# Patient Record
Sex: FEMALE | Race: BLACK OR AFRICAN AMERICAN | NOT HISPANIC OR LATINO | URBAN - METROPOLITAN AREA
[De-identification: names, ages, dates, MRNs, and addresses within clinical notes are randomized per-mention and may not be internally consistent; named-entity substitution may affect disease eponyms.]

---

## 2018-06-02 VITALS
HEART RATE: 76 BPM | WEIGHT: 138.01 LBS | TEMPERATURE: 98 F | SYSTOLIC BLOOD PRESSURE: 160 MMHG | RESPIRATION RATE: 16 BRPM | OXYGEN SATURATION: 98 % | DIASTOLIC BLOOD PRESSURE: 96 MMHG

## 2018-06-02 LAB
ALBUMIN SERPL ELPH-MCNC: 3.4 G/DL — SIGNIFICANT CHANGE UP (ref 3.3–5)
ALP SERPL-CCNC: 48 U/L — SIGNIFICANT CHANGE UP (ref 40–120)
ALT FLD-CCNC: 22 U/L — SIGNIFICANT CHANGE UP (ref 10–45)
ANION GAP SERPL CALC-SCNC: 13 MMOL/L — SIGNIFICANT CHANGE UP (ref 5–17)
APPEARANCE UR: CLEAR — SIGNIFICANT CHANGE UP
APTT BLD: 25.3 SEC — LOW (ref 27.5–37.4)
AST SERPL-CCNC: 75 U/L — HIGH (ref 10–40)
BASOPHILS NFR BLD AUTO: 0.1 % — SIGNIFICANT CHANGE UP (ref 0–2)
BILIRUB SERPL-MCNC: 0.6 MG/DL — SIGNIFICANT CHANGE UP (ref 0.2–1.2)
BILIRUB UR-MCNC: ABNORMAL
BUN SERPL-MCNC: 18 MG/DL — SIGNIFICANT CHANGE UP (ref 7–23)
CALCIUM SERPL-MCNC: 8.9 MG/DL — SIGNIFICANT CHANGE UP (ref 8.4–10.5)
CHLORIDE SERPL-SCNC: 90 MMOL/L — LOW (ref 96–108)
CK MB CFR SERPL CALC: 22 NG/ML — HIGH (ref 0–6.7)
CK SERPL-CCNC: 1837 U/L — HIGH (ref 25–170)
CO2 SERPL-SCNC: 24 MMOL/L — SIGNIFICANT CHANGE UP (ref 22–31)
COLOR SPEC: YELLOW — SIGNIFICANT CHANGE UP
CREAT SERPL-MCNC: 1.01 MG/DL — SIGNIFICANT CHANGE UP (ref 0.5–1.3)
DIFF PNL FLD: ABNORMAL
EOSINOPHIL NFR BLD AUTO: 0.1 % — SIGNIFICANT CHANGE UP (ref 0–6)
GLUCOSE SERPL-MCNC: 100 MG/DL — HIGH (ref 70–99)
GLUCOSE UR QL: NEGATIVE — SIGNIFICANT CHANGE UP
HCT VFR BLD CALC: 36.5 % — SIGNIFICANT CHANGE UP (ref 34.5–45)
HGB BLD-MCNC: 12.7 G/DL — SIGNIFICANT CHANGE UP (ref 11.5–15.5)
INR BLD: 0.92 — SIGNIFICANT CHANGE UP (ref 0.88–1.16)
KETONES UR-MCNC: 15 MG/DL
LEUKOCYTE ESTERASE UR-ACNC: NEGATIVE — SIGNIFICANT CHANGE UP
LYMPHOCYTES # BLD AUTO: 16.3 % — SIGNIFICANT CHANGE UP (ref 13–44)
MCHC RBC-ENTMCNC: 28 PG — SIGNIFICANT CHANGE UP (ref 27–34)
MCHC RBC-ENTMCNC: 34.8 G/DL — SIGNIFICANT CHANGE UP (ref 32–36)
MCV RBC AUTO: 80.4 FL — SIGNIFICANT CHANGE UP (ref 80–100)
MONOCYTES NFR BLD AUTO: 7.6 % — SIGNIFICANT CHANGE UP (ref 2–14)
NEUTROPHILS NFR BLD AUTO: 75.9 % — SIGNIFICANT CHANGE UP (ref 43–77)
NITRITE UR-MCNC: NEGATIVE — SIGNIFICANT CHANGE UP
PH UR: 6 — SIGNIFICANT CHANGE UP (ref 5–8)
PLATELET # BLD AUTO: 268 K/UL — SIGNIFICANT CHANGE UP (ref 150–400)
POTASSIUM SERPL-MCNC: SIGNIFICANT CHANGE UP MMOL/L (ref 3.5–5.3)
POTASSIUM SERPL-SCNC: SIGNIFICANT CHANGE UP MMOL/L (ref 3.5–5.3)
PROT SERPL-MCNC: 8.1 G/DL — SIGNIFICANT CHANGE UP (ref 6–8.3)
PROT UR-MCNC: 100 MG/DL
PROTHROM AB SERPL-ACNC: 10.2 SEC — SIGNIFICANT CHANGE UP (ref 9.8–12.7)
RBC # BLD: 4.54 M/UL — SIGNIFICANT CHANGE UP (ref 3.8–5.2)
RBC # FLD: 13.1 % — SIGNIFICANT CHANGE UP (ref 10.3–16.9)
SODIUM SERPL-SCNC: 127 MMOL/L — LOW (ref 135–145)
SP GR SPEC: 1.02 — SIGNIFICANT CHANGE UP (ref 1–1.03)
TROPONIN T SERPL-MCNC: 0.14 NG/ML — CRITICAL HIGH (ref 0–0.01)
UROBILINOGEN FLD QL: 0.2 E.U./DL — SIGNIFICANT CHANGE UP
WBC # BLD: 8.5 K/UL — SIGNIFICANT CHANGE UP (ref 3.8–10.5)
WBC # FLD AUTO: 8.5 K/UL — SIGNIFICANT CHANGE UP (ref 3.8–10.5)

## 2018-06-02 PROCEDURE — 99285 EMERGENCY DEPT VISIT HI MDM: CPT | Mod: 25

## 2018-06-02 PROCEDURE — 71045 X-RAY EXAM CHEST 1 VIEW: CPT | Mod: 26

## 2018-06-02 PROCEDURE — 70450 CT HEAD/BRAIN W/O DYE: CPT | Mod: 26

## 2018-06-02 PROCEDURE — 93010 ELECTROCARDIOGRAM REPORT: CPT

## 2018-06-02 PROCEDURE — 72170 X-RAY EXAM OF PELVIS: CPT | Mod: 26

## 2018-06-02 RX ORDER — ASPIRIN/CALCIUM CARB/MAGNESIUM 324 MG
325 TABLET ORAL ONCE
Qty: 0 | Refills: 0 | Status: COMPLETED | OUTPATIENT
Start: 2018-06-02 | End: 2018-06-02

## 2018-06-02 RX ORDER — SODIUM CHLORIDE 9 MG/ML
1000 INJECTION INTRAMUSCULAR; INTRAVENOUS; SUBCUTANEOUS
Qty: 0 | Refills: 0 | Status: DISCONTINUED | OUTPATIENT
Start: 2018-06-02 | End: 2018-06-03

## 2018-06-02 RX ADMIN — SODIUM CHLORIDE 200 MILLILITER(S): 9 INJECTION INTRAMUSCULAR; INTRAVENOUS; SUBCUTANEOUS at 21:49

## 2018-06-02 RX ADMIN — Medication 325 MILLIGRAM(S): at 21:49

## 2018-06-02 NOTE — ED ADULT TRIAGE NOTE - CHIEF COMPLAINT QUOTE
I have chest pain ,been going on since this morning;     + fall last night (frequent falls this past  few days)  , with pain /bruising to arms, back and legs; feeling weak and tired

## 2018-06-02 NOTE — ED ADULT NURSE NOTE - OBJECTIVE STATEMENT
pt received into [facility] from home with two family members at bedside s/p mechanical slip and fall at home last night around 2-3 AM. Pt with multiple recent falls per family trying to walk around without cane at home when she needs it. Pt also complaining of chest pain since this morning shortly after the fall, reports it resolved after being given 162 Aspirin by EMS on the way to ED. Pt A&Ox2 to person and place not exact date. Pt moves all extremities without difficulty no obvious deformities noted r/t recent falls. 12 lead EKG done NSR noted to continuous cardiac monitor respirations appear even and unlabored sating at 98% on room air. 20G placed to LAC labs drawn and sent awaiting Md dobbins will monitor and reassess pt in NAD informed and agreeable to plan

## 2018-06-02 NOTE — ED ADULT NURSE REASSESSMENT NOTE - NS ED NURSE REASSESS COMMENT FT1
per family who just arrived, pt had been complaining of chest pain x2 days. Report when they arrived today to find patient on the floor in apartment was laying no her back with the back of her head resting on the floor. Unable to state if she hit her head or lost consciousness but to her best knowledge she did not. Poor historian.

## 2018-06-03 ENCOUNTER — INPATIENT (INPATIENT)
Facility: HOSPITAL | Age: 83
LOS: 2 days | Discharge: NURSING, REHAB OR LONG-TERM CARE | DRG: 565 | End: 2018-06-06
Attending: INTERNAL MEDICINE | Admitting: INTERNAL MEDICINE
Payer: MEDICARE

## 2018-06-03 DIAGNOSIS — J45.909 UNSPECIFIED ASTHMA, UNCOMPLICATED: ICD-10-CM

## 2018-06-03 DIAGNOSIS — E87.8 OTHER DISORDERS OF ELECTROLYTE AND FLUID BALANCE, NOT ELSEWHERE CLASSIFIED: ICD-10-CM

## 2018-06-03 DIAGNOSIS — Z29.9 ENCOUNTER FOR PROPHYLACTIC MEASURES, UNSPECIFIED: ICD-10-CM

## 2018-06-03 DIAGNOSIS — H26.9 UNSPECIFIED CATARACT: Chronic | ICD-10-CM

## 2018-06-03 DIAGNOSIS — R55 SYNCOPE AND COLLAPSE: ICD-10-CM

## 2018-06-03 DIAGNOSIS — R63.8 OTHER SYMPTOMS AND SIGNS CONCERNING FOOD AND FLUID INTAKE: ICD-10-CM

## 2018-06-03 DIAGNOSIS — I10 ESSENTIAL (PRIMARY) HYPERTENSION: ICD-10-CM

## 2018-06-03 DIAGNOSIS — R74.8 ABNORMAL LEVELS OF OTHER SERUM ENZYMES: ICD-10-CM

## 2018-06-03 DIAGNOSIS — M62.82 RHABDOMYOLYSIS: ICD-10-CM

## 2018-06-03 LAB
ANION GAP SERPL CALC-SCNC: 15 MMOL/L — SIGNIFICANT CHANGE UP (ref 5–17)
BUN SERPL-MCNC: 17 MG/DL — SIGNIFICANT CHANGE UP (ref 7–23)
CALCIUM SERPL-MCNC: 8.4 MG/DL — SIGNIFICANT CHANGE UP (ref 8.4–10.5)
CHLORIDE SERPL-SCNC: 93 MMOL/L — LOW (ref 96–108)
CK SERPL-CCNC: 1850 U/L — HIGH (ref 25–170)
CO2 SERPL-SCNC: 23 MMOL/L — SIGNIFICANT CHANGE UP (ref 22–31)
CREAT SERPL-MCNC: 0.99 MG/DL — SIGNIFICANT CHANGE UP (ref 0.5–1.3)
GLUCOSE SERPL-MCNC: 74 MG/DL — SIGNIFICANT CHANGE UP (ref 70–99)
POTASSIUM SERPL-MCNC: 3.6 MMOL/L — SIGNIFICANT CHANGE UP (ref 3.5–5.3)
POTASSIUM SERPL-MCNC: 4.8 MMOL/L — SIGNIFICANT CHANGE UP (ref 3.5–5.3)
POTASSIUM SERPL-SCNC: 3.6 MMOL/L — SIGNIFICANT CHANGE UP (ref 3.5–5.3)
POTASSIUM SERPL-SCNC: 4.8 MMOL/L — SIGNIFICANT CHANGE UP (ref 3.5–5.3)
SODIUM SERPL-SCNC: 131 MMOL/L — LOW (ref 135–145)
TROPONIN T SERPL-MCNC: 0.13 NG/ML — CRITICAL HIGH (ref 0–0.01)

## 2018-06-03 PROCEDURE — 99220: CPT

## 2018-06-03 RX ORDER — NIFEDIPINE 30 MG
30 TABLET, EXTENDED RELEASE 24 HR ORAL DAILY
Qty: 0 | Refills: 0 | Status: DISCONTINUED | OUTPATIENT
Start: 2018-06-03 | End: 2018-06-06

## 2018-06-03 RX ORDER — POTASSIUM CHLORIDE 20 MEQ
40 PACKET (EA) ORAL ONCE
Qty: 0 | Refills: 0 | Status: COMPLETED | OUTPATIENT
Start: 2018-06-03 | End: 2018-06-03

## 2018-06-03 RX ORDER — HEPARIN SODIUM 5000 [USP'U]/ML
5000 INJECTION INTRAVENOUS; SUBCUTANEOUS EVERY 8 HOURS
Qty: 0 | Refills: 0 | Status: DISCONTINUED | OUTPATIENT
Start: 2018-06-03 | End: 2018-06-06

## 2018-06-03 RX ORDER — MONTELUKAST 4 MG/1
0 TABLET, CHEWABLE ORAL
Qty: 0 | Refills: 0 | COMMUNITY

## 2018-06-03 RX ORDER — SODIUM CHLORIDE 9 MG/ML
1000 INJECTION INTRAMUSCULAR; INTRAVENOUS; SUBCUTANEOUS ONCE
Qty: 0 | Refills: 0 | Status: COMPLETED | OUTPATIENT
Start: 2018-06-03 | End: 2018-06-03

## 2018-06-03 RX ORDER — SODIUM CHLORIDE 9 MG/ML
1000 INJECTION INTRAMUSCULAR; INTRAVENOUS; SUBCUTANEOUS
Qty: 0 | Refills: 0 | Status: DISCONTINUED | OUTPATIENT
Start: 2018-06-03 | End: 2018-06-04

## 2018-06-03 RX ORDER — LOSARTAN POTASSIUM 100 MG/1
50 TABLET, FILM COATED ORAL DAILY
Qty: 0 | Refills: 0 | Status: DISCONTINUED | OUTPATIENT
Start: 2018-06-03 | End: 2018-06-06

## 2018-06-03 RX ORDER — NIFEDIPINE 30 MG
0 TABLET, EXTENDED RELEASE 24 HR ORAL
Qty: 0 | Refills: 0 | COMMUNITY

## 2018-06-03 RX ORDER — NIFEDIPINE 30 MG
30 TABLET, EXTENDED RELEASE 24 HR ORAL ONCE
Qty: 0 | Refills: 0 | Status: COMPLETED | OUTPATIENT
Start: 2018-06-03 | End: 2018-06-03

## 2018-06-03 RX ORDER — ALBUTEROL 90 UG/1
2 AEROSOL, METERED ORAL
Qty: 0 | Refills: 0 | Status: DISCONTINUED | OUTPATIENT
Start: 2018-06-03 | End: 2018-06-06

## 2018-06-03 RX ORDER — ASPIRIN/CALCIUM CARB/MAGNESIUM 324 MG
81 TABLET ORAL DAILY
Qty: 0 | Refills: 0 | Status: DISCONTINUED | OUTPATIENT
Start: 2018-06-03 | End: 2018-06-06

## 2018-06-03 RX ORDER — MONTELUKAST 4 MG/1
10 TABLET, CHEWABLE ORAL DAILY
Qty: 0 | Refills: 0 | Status: DISCONTINUED | OUTPATIENT
Start: 2018-06-03 | End: 2018-06-06

## 2018-06-03 RX ADMIN — HEPARIN SODIUM 5000 UNIT(S): 5000 INJECTION INTRAVENOUS; SUBCUTANEOUS at 13:44

## 2018-06-03 RX ADMIN — MONTELUKAST 10 MILLIGRAM(S): 4 TABLET, CHEWABLE ORAL at 11:51

## 2018-06-03 RX ADMIN — Medication 81 MILLIGRAM(S): at 11:51

## 2018-06-03 RX ADMIN — Medication 30 MILLIGRAM(S): at 11:51

## 2018-06-03 RX ADMIN — SODIUM CHLORIDE 1000 MILLILITER(S): 9 INJECTION INTRAMUSCULAR; INTRAVENOUS; SUBCUTANEOUS at 06:43

## 2018-06-03 RX ADMIN — HEPARIN SODIUM 5000 UNIT(S): 5000 INJECTION INTRAVENOUS; SUBCUTANEOUS at 06:46

## 2018-06-03 RX ADMIN — HEPARIN SODIUM 5000 UNIT(S): 5000 INJECTION INTRAVENOUS; SUBCUTANEOUS at 21:09

## 2018-06-03 RX ADMIN — Medication 40 MILLIEQUIVALENT(S): at 06:46

## 2018-06-03 RX ADMIN — Medication 30 MILLIGRAM(S): at 01:22

## 2018-06-03 RX ADMIN — LOSARTAN POTASSIUM 50 MILLIGRAM(S): 100 TABLET, FILM COATED ORAL at 06:46

## 2018-06-03 RX ADMIN — SODIUM CHLORIDE 50 MILLILITER(S): 9 INJECTION INTRAMUSCULAR; INTRAVENOUS; SUBCUTANEOUS at 13:44

## 2018-06-03 NOTE — ED PROVIDER NOTE - ATTENDING CONTRIBUTION TO CARE
I visualized pt, was present during key decision making and reviewed EKG as well as pertinent lab findings. 89 yo female with h/o HTN and CVA  presents to ED with 3 days of chest pain as well as a fall at home. Found on the floor by HHA. Pt lives by herself at home, grandsons come to visit often and found her yesterday on the floor in the bathroom lying on back and head. Pt states mild pain in back of head and has had 3 days of intermittent chest pain with no radiation, and 2 episodes of vomiting yesterday, NBNB. Pt currently complains of leg weakness. Denies SOB, LORA, heart palpitations, fever, chills, numbness/tingling. Pt family member states she took ASA several years ago. I visualized pt, was present during key decision making and reviewed EKG as well as pertinent lab findings. 89 yo female with h/o HTN and CVA  presents to ED with 3 days of chest pain as well as a fall at home. Found on the floor by grandson. EKG non-ischemic however in mild rhabdo with elevated troponin. Pt currently chest pain free, nl VS, no signs of trauma, CT head unremarkable. Will admit to cardiac telemetry for trending of troponins.

## 2018-06-03 NOTE — H&P ADULT - PROBLEM SELECTOR PLAN 4
On Losartan-HCTZ 25-12.5 and Nifedipine XL 30mg at home. Will continue as no renal dysfunction as result of rhabdo, and BP was elevated in ED.   Will watch to ensure that patient does not become hypotensive, or orthostatic. On Losartan-HCTZ 25-12.5 and Nifedipine XL 30mg at home. Will continue as no renal dysfunction as result of rhabdo, and BP was elevated in ED.   Will watch to ensure that patient does not become hypotensive, or orthostatic.  Holding HCTZ in setting of dehydration

## 2018-06-03 NOTE — H&P ADULT - PROBLEM SELECTOR PLAN 1
Cardiogenic vs. orthostatic Multiple falls over the past 3 days in setting of vomiting, decreased PO intake and HCTZ use. Patient reports feeling dizzy prior to falling and falls all occurred soon after standing up. + Orthostatics on admission. Falls likely due to orthostatic hypotension. Patient denies cp or palpitation prior to fall making cardiogenic etiology less likely. Denies LOC.   -C/w hydration, NS @ 200cc/hr  -Repeat orthostatics in AM  -Hold hydrochlorothiazide  -Clarify med list with family Multiple falls over the past 3 days in setting of vomiting, decreased PO intake and HCTZ use. Patient reports feeling dizzy prior to falling and falls all occurred soon after standing up. + Orthostatics on admission. Falls likely due to orthostatic hypotension. Patient denies cp or palpitation prior to fall making cardiogenic etiology less likely. Denies LOC.   -Bolus 1L NS cx2 then c/w NS @ 200cc/hr  -Repeat orthostatics in AM  -Hold hydrochlorothiazide  -Clarify med list with family

## 2018-06-03 NOTE — H&P ADULT - ASSESSMENT
89 yo F with PMH HTN, asthma, stroke (>10 years ago, no residual deficits) presenting with multiple falls in the past day, likely due to orthostatic hypotension. Patient also reporting chest pain of unclear chronicity and with positive troponins, however no ischemic changes on ECG.

## 2018-06-03 NOTE — H&P ADULT - NSHPREVIEWOFSYSTEMS_GEN_ALL_CORE
Patient denies any recent URI symptoms, f/c, diarrhea, dysuria.    Reports chronic headaches and blurry vision.

## 2018-06-03 NOTE — H&P ADULT - PROBLEM SELECTOR PLAN 2
Troponin elevated to 0.14 on admission, ECG without acute ischemic changes. Patient reports chest pain with exertion relieved with rest typical of angina, but denies any recent changes in her chronic chest pain. However the family reports she has been complaining of CP over the past 2 days.   -S/p ASA 300mg in ED  -Troponin peaked at 0.14  -F/u AM ECG Troponin elevated to 0.14 on admission, ECG without acute ischemic changes. Patient reports chest pain with exertion relieved with rest typical of angina, but denies any recent changes in her chronic chest pain. However the family reports she has been complaining of CP over the past 2 days. CP free on admission.   -S/p ASA 300mg in ED  -Troponin peaked at 0.14  -F/u AM ECG Troponin elevated to 0.14 on admission, ECG without acute ischemic changes. Patient reports chest pain with exertion relieved with rest typical of angina, but denies any recent changes in her chronic chest pain. However the family reports she has been complaining of CP over the past 2 days. CP free on admission. Elevation may be false positive in setting of mild rhabdo, less likely NSTEMI given patient describing chronic, unchanged chest pain.   -S/p ASA 300mg in ED  -Troponin peaked at 0.14  -F/u AM ECG Troponin elevated to 0.14 on admission, ECG without acute ischemic changes. Patient reports chest pain with exertion relieved with rest typical of angina, but denies any recent changes in her chronic chest pain. However the family reports she has been complaining of CP over the past 2 days. CP free on admission. Elevation may be false positive in setting of mild rhabdo, less likely NSTEMI given patient describing chronic, unchanged chest pain.   -S/p ASA 300mg in ED  -ASA 81mg daily  -Troponin peaked at 0.14  -F/u AM ECG

## 2018-06-03 NOTE — H&P ADULT - NSHPPHYSICALEXAM_GEN_ALL_CORE
.  VITAL SIGNS:  T(C): 36.2 (06-03-18 @ 00:23), Max: 36.7 (06-02-18 @ 20:14)  T(F): 97.2 (06-03-18 @ 00:23), Max: 98.1 (06-02-18 @ 20:14)  HR: 70 (06-03-18 @ 00:23) (70 - 76)  BP: 187/70 (06-03-18 @ 00:23) (160/96 - 187/70)  BP(mean): --  RR: 16 (06-03-18 @ 00:23) (16 - 16)  SpO2: 100% (06-03-18 @ 00:23) (98% - 100%)  Wt(kg): --    PHYSICAL EXAM:    Constitutional: WDWN resting comfortably in bed; NAD  Head: NC/AT  Eyes: PERRL, EOMI, clear conjunctiva  ENT: no nasal discharge; uvula midline, no oropharyngeal erythema or exudates; MMM  Neck: supple; no JVD or thyromegaly  Respiratory: CTA B/L; no W/R/R, no retractions  Cardiac: +S1/S2; RRR; no M/R/G; PMI non-displaced  Gastrointestinal: soft, NT/ND; no rebound or guarding; +BSx4  Genitourinary: normal external genitalia  Back: spine midline, no bony tenderness or step-offs; no CVAT B/L  Extremities: WWP, no clubbing or cyanosis; no peripheral edema  Musculoskeletal: NROM x4; no joint swelling, tenderness or erythema  Vascular: 2+ radial, femoral, DP/PT pulses B/L  Dermatologic: skin warm, dry and intact; no rashes, wounds, or scars  Lymphatic: no submandibular or cervical LAD  Neurologic: AAOx3; CNII-XII grossly intact; no focal deficits  Psychiatric: affect and characteristics of appearance, verbalizations, behaviors are appropriate VITAL SIGNS:  T(C): 36.2 (06-03-18 @ 00:23), Max: 36.7 (06-02-18 @ 20:14)  T(F): 97.2 (06-03-18 @ 00:23), Max: 98.1 (06-02-18 @ 20:14)  HR: 70 (06-03-18 @ 00:23) (70 - 76)  BP: 187/70 (06-03-18 @ 00:23) (160/96 - 187/70)  BP(mean): --  RR: 16 (06-03-18 @ 00:23) (16 - 16)  SpO2: 100% (06-03-18 @ 00:23) (98% - 100%)  Wt(kg): --    PHYSICAL EXAM:    Constitutional: Elderly female resting comfortably in bed; NAD  Head: NC/AT  Eyes: PERRL, EOMI, clear conjunctiva  ENT: no nasal discharge; uvula midline, no oropharyngeal erythema or exudates; MM dry  Neck: supple; no JVD  Respiratory: CTA B/L; no W/R/R, decreased breath sounds at bases.   Cardiac: +S1/S2; RRR; no M/R/G  Gastrointestinal: soft, NT/ND; no rebound or guarding; +BS  Genitourinary: + Montoya in place  Extremities: WWP, no clubbing or cyanosis; no peripheral edema  Vascular: 2+ radial pulses B/L  Dermatologic: skin warm, dry and intact; no rashes, wounds, or scars  Neurologic: AAOx2; oriented to person and place but not year.

## 2018-06-03 NOTE — H&P ADULT - HISTORY OF PRESENT ILLNESS
89 yo F with PMH HTN, asthma, stroke (>10 years ago, no residual deficits) presenting with 3 falls in the past day. The falls occurred soon after getting up from sitting/ laying down. She reports feeling dizzy for the past few days, but denies any chest pain or palpitations prior to the fall. She reports hitting her head but denies LOC. She was unable to get up after the fall, her son found her down. It is unclear how long she was down after each fall. She reports episodes of vomiting over the past 2 days (unclear frequency) and decreased PO intake. She reports that she has not taken her medications for the past 2 days. Prior to that she reports taking her medications regularly but is unable to remember the names of any of her medications. Per her pharmacy she was prescribed losartan-HCTZ 1 month ago, however according to med list provided by her son she is only taking Nifedipine 30mg. It is unclear if she is taking her medications as prescribed. Patient lives alone but family helps with shopping/ cooking and report a decline in her functioning and increasing weakness over the past year.     In the ED, patient was afebrile Tm 89 yo F with PMH HTN, asthma, stroke (>10 years ago, no residual deficits) presenting with 3 falls in the past day. The falls occurred soon after getting up from sitting/ laying down. She reports feeling dizzy for the past few days, but denies any chest pain or palpitations prior to the fall.  She reports hitting her head but denies LOC. She was unable to get up after the fall, her son found her down. It is unclear how long she was down after each fall. However she does report intermittent chest pain described as "tightness" that is provoked by activity and relieved with rest, reports this has been going on for a "long time" however the family reports she has been c/o CP more over the past 2 days. She also reports intermittent SOB, unclear if it is associated with chest pain or asthma. She reports episodes of vomiting over the past 2 days (unclear frequency) and decreased PO intake. She reports that she has not taken her medications for the past 2 days. Prior to that she reports taking her medications regularly but is unable to remember the names of any of her medications. Per her pharmacy she was prescribed losartan-HCTZ 1 month ago, however according to med list provided by her son her only cardiac med is Nifedipine 30mg. It is unclear if she is taking her medications as prescribed. Patient lives alone but family helps with shopping/ cooking and report a decline in her functioning and increasing weakness over the past year.     In the ED, patient was afebrile Tm 98.1, HR 60-75, //70, RR 16 O2 sat %. Labs significant for trop 0.14, CK 1800, and hyponatremia to 127. ECG NSR with bifascicular block, no acute ischemic changes. CT head negative for ICH or fracture.

## 2018-06-03 NOTE — ED ADULT NURSE REASSESSMENT NOTE - NS ED NURSE REASSESS COMMENT FT1
pt BP elevated as noted, reports did not take her normal BP med today Nifedipine ER 30mg Dima Cannon made aware will medicate per orders monitor and reassess. Pt in NAD informed and agreeable to plan

## 2018-06-03 NOTE — H&P ADULT - PROBLEM SELECTOR PLAN 3
Patient found down by son multiple times in past day. CK elevated to 1800s on admission and UA + for blood but no RBCs.   -C/w hydration as above. Patient found down by son multiple times in past day. CK elevated to 1800s on admission and UA + for blood but no RBCs.   -C/w hydration as above.  -Trend CK

## 2018-06-03 NOTE — H&P ADULT - PROBLEM SELECTOR PLAN 8
F: NS @ 200cc/hr  E: Replete PRN  N:     FULL CODE F: NS @ 200cc/hr  E: Replete PRN  N: DASH diet    FULL CODE

## 2018-06-03 NOTE — H&P ADULT - PROBLEM SELECTOR PLAN 5
HypoNa, HypoCl is most likely due to effect of HCTZ in addition to hypovolemia and poor PO intake, as shown by + ketones in UA.   Potassium hemolyzed on admission, will repeat labs in AM. Will also obtain urine lytes at that time as well.   Continue on NS 200cc/hour at this time for rhabdo, this will likely correct the Na as well.   Will hold off on HCTZ portion of combo to avoid hypovolemia.   Montoya placed for strict I&O.

## 2018-06-03 NOTE — ED PROVIDER NOTE - MEDICAL DECISION MAKING DETAILS
PT HAD CHEST PAIN YESTERDAY AND FALLS NOW ELEVATED TROP DW DR BUCK WILL ADMIT FOR FLUIDS FOR ELEVATED CPK AND LOW SODIUM AND TREND TROPS

## 2018-06-03 NOTE — ED PROVIDER NOTE - OBJECTIVE STATEMENT
89 yo female with a pmhx of HTN and stroke several years ago, presents to ED with 3 days of chest pain and multiple falls yesterday and today. Pt lives by herself at home, grandsons come to visit often and found her yesterday on the floor in the bathroom lying on back and head. Pt states mild pain in back of head and has had 3 days of intermittent chest pain with no radiation, and 2 episodes of vomiting yesterday, NBNB. Pt currently complains of leg weakness. Denies SOB, LORA, heart palpitations, fever, chills, numbness/tingling. Pt family member states she took ASA several years ago.

## 2018-06-03 NOTE — H&P ADULT - NSHPLABSRESULTS_GEN_ALL_CORE
LABS:                         12.7   8.5   )-----------( 268      ( 2018 20:31 )             36.5     06-03    x   |  x   |  x   ----------------------------<  x   4.8   |  x   |  x     Ca    8.9      2018 20:31    TPro  8.1  /  Alb  3.4  /  TBili  0.6  /  DBili  x   /  AST  75<H>  /  ALT  22  /  AlkPhos  48  06-02    PT/INR - ( 2018 20:31 )   PT: 10.2 sec;   INR: 0.92          PTT - ( 2018 20:31 )  PTT:25.3 sec  Urinalysis Basic - ( 2018 21:23 )    Color: Yellow / Appearance: Clear / S.020 / pH: x  Gluc: x / Ketone: 15 mg/dL  / Bili: Small / Urobili: 0.2 E.U./dL   Blood: x / Protein: 100 mg/dL / Nitrite: NEGATIVE   Leuk Esterase: NEGATIVE / RBC: < 5 /HPF / WBC < 5 /HPF   Sq Epi: x / Non Sq Epi: 5-10 /HPF / Bacteria: Present /HPF      CARDIAC MARKERS ( 2018 00:33 )  x     / 0.13 ng/mL / x     / x     / x      CARDIAC MARKERS ( 2018 20:31 )  x     / 0.14 ng/mL / 1837 U/L / x     / 22.0 ng/mL            RADIOLOGY, EKG & ADDITIONAL TESTS: Reviewed. LABS:                         12.7   8.5   )-----------( 268      ( 2018 20:31 )             36.5     06-03    x   |  x   |  x   ----------------------------<  x   4.8   |  x   |  x     Ca    8.9      2018 20:31    TPro  8.1  /  Alb  3.4  /  TBili  0.6  /  DBili  x   /  AST  75<H>  /  ALT  22  /  AlkPhos  48  06-02    PT/INR - ( 2018 20:31 )   PT: 10.2 sec;   INR: 0.92          PTT - ( 2018 20:31 )  PTT:25.3 sec  Urinalysis Basic - ( 2018 21:23 )    Color: Yellow / Appearance: Clear / S.020 / pH: x  Gluc: x / Ketone: 15 mg/dL  / Bili: Small / Urobili: 0.2 E.U./dL   Blood: x / Protein: 100 mg/dL / Nitrite: NEGATIVE   Leuk Esterase: NEGATIVE / RBC: < 5 /HPF / WBC < 5 /HPF   Sq Epi: x / Non Sq Epi: 5-10 /HPF / Bacteria: Present /HPF      CARDIAC MARKERS ( 2018 00:33 )  x     / 0.13 ng/mL / x     / x     / x      CARDIAC MARKERS ( 2018 20:31 )  x     / 0.14 ng/mL / 1837 U/L / x     / 22.0 ng/mL        RADIOLOGY, EKG & ADDITIONAL TESTS:     < from: CT Head No Cont (18 @ 22:20) >    FINDINGS:     The size and configuration of the ventricles and sulci are appropriate   for patient's age. Focal wedge-shaped area of encephalomalacia/gliosis   involving the left occipitotemporal lobe, consistent with remote   infarction. Ex vacuo dilatation of the adjacent posterior left lateral   ventricle. No acute intracranial hemorrhage or transcortical infarction.   No extra-axial fluid collection, herniation, or midline shift. Confluent   periventricular lucency is noted, suggestive of microangiopathic ischemic   disease.    The calvarium is intact. Opacified right sphenoid sinus. The remaining   visualized paranasal sinuses and mastoid air cells are clear.     IMPRESSION:     No acute intracranial hemorrhage or calvarial fracture. Chronic findings,   as above.      < end of copied text >

## 2018-06-04 ENCOUNTER — TRANSCRIPTION ENCOUNTER (OUTPATIENT)
Age: 83
End: 2018-06-04

## 2018-06-04 DIAGNOSIS — R29.6 REPEATED FALLS: ICD-10-CM

## 2018-06-04 DIAGNOSIS — E87.1 HYPO-OSMOLALITY AND HYPONATREMIA: ICD-10-CM

## 2018-06-04 DIAGNOSIS — I95.1 ORTHOSTATIC HYPOTENSION: ICD-10-CM

## 2018-06-04 LAB
ANION GAP SERPL CALC-SCNC: 11 MMOL/L — SIGNIFICANT CHANGE UP (ref 5–17)
BUN SERPL-MCNC: 11 MG/DL — SIGNIFICANT CHANGE UP (ref 7–23)
CALCIUM SERPL-MCNC: 9.2 MG/DL — SIGNIFICANT CHANGE UP (ref 8.4–10.5)
CHLORIDE SERPL-SCNC: 99 MMOL/L — SIGNIFICANT CHANGE UP (ref 96–108)
CHOLEST SERPL-MCNC: 202 MG/DL — HIGH (ref 10–199)
CO2 SERPL-SCNC: 27 MMOL/L — SIGNIFICANT CHANGE UP (ref 22–31)
CREAT SERPL-MCNC: 0.87 MG/DL — SIGNIFICANT CHANGE UP (ref 0.5–1.3)
CULTURE RESULTS: SIGNIFICANT CHANGE UP
GLUCOSE SERPL-MCNC: 83 MG/DL — SIGNIFICANT CHANGE UP (ref 70–99)
HDLC SERPL-MCNC: 91 MG/DL — SIGNIFICANT CHANGE UP (ref 40–125)
LIPID PNL WITH DIRECT LDL SERPL: 99 MG/DL — SIGNIFICANT CHANGE UP
MAGNESIUM SERPL-MCNC: 1.5 MG/DL — LOW (ref 1.6–2.6)
POTASSIUM SERPL-MCNC: 3.7 MMOL/L — SIGNIFICANT CHANGE UP (ref 3.5–5.3)
POTASSIUM SERPL-SCNC: 3.7 MMOL/L — SIGNIFICANT CHANGE UP (ref 3.5–5.3)
SODIUM SERPL-SCNC: 137 MMOL/L — SIGNIFICANT CHANGE UP (ref 135–145)
SPECIMEN SOURCE: SIGNIFICANT CHANGE UP
TOTAL CHOLESTEROL/HDL RATIO MEASUREMENT: 2.2 RATIO — LOW (ref 3.3–7.1)
TRIGL SERPL-MCNC: 60 MG/DL — SIGNIFICANT CHANGE UP (ref 10–149)
TSH SERPL-MCNC: 3.04 UIU/ML — SIGNIFICANT CHANGE UP (ref 0.35–4.94)

## 2018-06-04 PROCEDURE — 99233 SBSQ HOSP IP/OBS HIGH 50: CPT

## 2018-06-04 PROCEDURE — 99239 HOSP IP/OBS DSCHRG MGMT >30: CPT

## 2018-06-04 RX ORDER — LOSARTAN POTASSIUM 100 MG/1
1 TABLET, FILM COATED ORAL
Qty: 30 | Refills: 0 | OUTPATIENT
Start: 2018-06-04 | End: 2018-07-03

## 2018-06-04 RX ORDER — LOSARTAN/HYDROCHLOROTHIAZIDE 100MG-25MG
1 TABLET ORAL
Qty: 0 | Refills: 0 | COMMUNITY

## 2018-06-04 RX ORDER — ASPIRIN/CALCIUM CARB/MAGNESIUM 324 MG
1 TABLET ORAL
Qty: 30 | Refills: 0 | OUTPATIENT
Start: 2018-06-04 | End: 2018-07-03

## 2018-06-04 RX ORDER — POTASSIUM CHLORIDE 20 MEQ
40 PACKET (EA) ORAL ONCE
Qty: 0 | Refills: 0 | Status: COMPLETED | OUTPATIENT
Start: 2018-06-04 | End: 2018-06-04

## 2018-06-04 RX ORDER — MAGNESIUM SULFATE 500 MG/ML
3 VIAL (ML) INJECTION ONCE
Qty: 0 | Refills: 0 | Status: COMPLETED | OUTPATIENT
Start: 2018-06-04 | End: 2018-06-04

## 2018-06-04 RX ADMIN — HEPARIN SODIUM 5000 UNIT(S): 5000 INJECTION INTRAVENOUS; SUBCUTANEOUS at 15:20

## 2018-06-04 RX ADMIN — Medication 40 MILLIEQUIVALENT(S): at 07:17

## 2018-06-04 RX ADMIN — LOSARTAN POTASSIUM 50 MILLIGRAM(S): 100 TABLET, FILM COATED ORAL at 05:33

## 2018-06-04 RX ADMIN — MONTELUKAST 10 MILLIGRAM(S): 4 TABLET, CHEWABLE ORAL at 12:14

## 2018-06-04 RX ADMIN — Medication 81 MILLIGRAM(S): at 12:14

## 2018-06-04 RX ADMIN — HEPARIN SODIUM 5000 UNIT(S): 5000 INJECTION INTRAVENOUS; SUBCUTANEOUS at 21:25

## 2018-06-04 RX ADMIN — Medication 25 GRAM(S): at 07:17

## 2018-06-04 RX ADMIN — HEPARIN SODIUM 5000 UNIT(S): 5000 INJECTION INTRAVENOUS; SUBCUTANEOUS at 06:20

## 2018-06-04 RX ADMIN — Medication 30 MILLIGRAM(S): at 05:33

## 2018-06-04 NOTE — DISCHARGE NOTE ADULT - SECONDARY DIAGNOSIS.
Asthma, unspecified asthma severity, unspecified whether complicated, unspecified whether persistent Cerebrovascular accident (CVA), unspecified mechanism Essential hypertension Traumatic rhabdomyolysis, initial encounter Hyponatremia

## 2018-06-04 NOTE — DISCHARGE NOTE ADULT - PLAN OF CARE
prevent recurrence You were admitted with orthostatic hypotension, which is where your blood pressure drops when going from sitting to standing.  The cause of your orthostatic hypotension was likely dehydration.  Drink plenty of fluids and eat three good meals per day.  Stop taking hydrochlorothiazide.  Follow up with your PMD within one week Continue taking singulair and albuterol as directed. follow up with your pmd. continue taking losartan and nifedipine as directed.  STOP TAKING HYDROCHLOROTHIAZIDE You presented with hyponatremia which is a low level of sodium in your blood.  This was likely caused by dehydration and a blood pressure medication you were taking, hydrochlorothiazide (HCTZ).  STOP TAKING HYDROCHLOROTHIAZIDE.  Eat three good meals per day. Rhabdomyolysis is a breakdown of muscle.  You experienced a mild rhabdomyolysis due to your falls.  There were no significant sequelae of this.  follow up with your pmd.

## 2018-06-04 NOTE — PHYSICAL THERAPY INITIAL EVALUATION ADULT - IMPAIRMENTS FOUND, PT EVAL
muscle strength/poor safety awareness/posture/aerobic capacity/endurance/gait, locomotion, and balance/ergonomics and body mechanics

## 2018-06-04 NOTE — PHYSICAL THERAPY INITIAL EVALUATION ADULT - PLANNED THERAPY INTERVENTIONS, PT EVAL
postural re-education/strengthening/transfer training/balance training/bed mobility training/gait training/ROM

## 2018-06-04 NOTE — PHYSICAL THERAPY INITIAL EVALUATION ADULT - ADDITIONAL COMMENTS
Patient lives in home with Son with 5 steps to enter. Patient uses SC/RW at baseline depending on day and requires assist from Son. Patient with h/o multiple falls in the home.

## 2018-06-04 NOTE — PROGRESS NOTE ADULT - SUBJECTIVE AND OBJECTIVE BOX
INTERVAL/OVERNIGHT EVENTS:  Troponin trending up to 0.03.  Otherwise NITESH.      SUBJECTIVE:  Seen and examined at bedside.    ROS otherwise negative.    VITAL SIGNS:  T(F): 98 (18 @ 05:21)  HR: 84 (18 @ 06:40)  BP: 165/94 (18 @ 06:40)  RR: 22 (18 @ 06:40)  SpO2: 97% (18 @ 06:40)  Wt(kg): --    PHYSICAL EXAM:        MEDICATIONS  (STANDING):  aspirin enteric coated 81 milliGRAM(s) Oral daily  heparin  Injectable 5000 Unit(s) SubCutaneous every 8 hours  losartan 50 milliGRAM(s) Oral daily  montelukast 10 milliGRAM(s) Oral daily  NIFEdipine XL 30 milliGRAM(s) Oral daily  sodium chloride 0.9%. 1000 milliLiter(s) (50 mL/Hr) IV Continuous <Continuous>    MEDICATIONS  (PRN):  ALBUTerol    90 MICROgram(s) HFA Inhaler 2 Puff(s) Inhalation four times a day PRN Shortness of Breath and/or Wheezing      Allergies    No Known Allergies    Intolerances      ALBUTerol    90 MICROgram(s) HFA Inhaler 2 Puff(s) Inhalation four times a day PRN  aspirin enteric coated 81 milliGRAM(s) Oral daily  heparin  Injectable 5000 Unit(s) SubCutaneous every 8 hours  losartan 50 milliGRAM(s) Oral daily  montelukast 10 milliGRAM(s) Oral daily  NIFEdipine XL 30 milliGRAM(s) Oral daily  sodium chloride 0.9%. 1000 milliLiter(s) IV Continuous <Continuous>      LABS:                          12.7   8.5   )-----------( 268      ( 2018 20:31 )             36.5     06-04    137  |  99  |  11  ----------------------------<  83  3.7   |  27  |  0.87    Ca    9.2      2018 06:23  Mg     1.5     -04    TPro  8.1  /  Alb  3.4  /  TBili  0.6  /  DBili  x   /  AST  75<H>  /  ALT  22  /  AlkPhos  48  06-02    PT/INR - ( 2018 20:31 )   PT: 10.2 sec;   INR: 0.92          PTT - ( 2018 20:31 )  PTT:25.3 sec  Urinalysis Basic - ( 2018 21:23 )    Color: Yellow / Appearance: Clear / S.020 / pH: x  Gluc: x / Ketone: 15 mg/dL  / Bili: Small / Urobili: 0.2 E.U./dL   Blood: x / Protein: 100 mg/dL / Nitrite: NEGATIVE   Leuk Esterase: NEGATIVE / RBC: < 5 /HPF / WBC < 5 /HPF   Sq Epi: x / Non Sq Epi: 5-10 /HPF / Bacteria: Present /HPF        RADIOLOGY & ADDITIONAL TESTS:  All imaging reviewed. INTERVAL/OVERNIGHT EVENTS:  Troponin trending up to 0.03.  Otherwise NITESH.      SUBJECTIVE:  Seen and examined at bedside.  Reports feeling well.  Denies lightheadedness, CP, palpitations, SOB, cough, orthopnea, abd pain, N/V/D/C, F/C, leg pain or edema.    ROS otherwise negative.    VITAL SIGNS:  T(F): 98 (18 @ 05:21)  HR: 84 (18 @ 06:40)  BP: 165/94 (18 @ 06:40)  RR: 22 (18 @ 06:40)  SpO2: 97% (18 @ 06:40)  Wt(kg): --    PHYSICAL EXAM:    	Constitutional: Elderly female resting comfortably in bed; NAD, ox2 (not time)  	Head: NC/AT  	Eyes: PERRL, EOMI, clear conjunctiva  	ENT: no nasal discharge; uvula midline, no oropharyngeal erythema or exudates; MMM  	Neck: supple; no JVD  	Respiratory: CTA B/L; no W/R/R, decreased breath sounds at bases.   	Cardiac: +S1/S2; RRR; no M/R/G  	Gastrointestinal: soft, NT/ND; no rebound or guarding; +BS  	Extremities: WWP, no clubbing or cyanosis; no peripheral edema  	Vascular: 2+ radial pulses B/L  	Dermatologic: skin warm, dry and intact; no rashes, wounds, or scars  Neurologic: AAOx2; oriented to person and place but not year.    MEDICATIONS  (STANDING):  aspirin enteric coated 81 milliGRAM(s) Oral daily  heparin  Injectable 5000 Unit(s) SubCutaneous every 8 hours  losartan 50 milliGRAM(s) Oral daily  montelukast 10 milliGRAM(s) Oral daily  NIFEdipine XL 30 milliGRAM(s) Oral daily  sodium chloride 0.9%. 1000 milliLiter(s) (50 mL/Hr) IV Continuous <Continuous>    MEDICATIONS  (PRN):  ALBUTerol    90 MICROgram(s) HFA Inhaler 2 Puff(s) Inhalation four times a day PRN Shortness of Breath and/or Wheezing      Allergies    No Known Allergies    Intolerances      ALBUTerol    90 MICROgram(s) HFA Inhaler 2 Puff(s) Inhalation four times a day PRN  aspirin enteric coated 81 milliGRAM(s) Oral daily  heparin  Injectable 5000 Unit(s) SubCutaneous every 8 hours  losartan 50 milliGRAM(s) Oral daily  montelukast 10 milliGRAM(s) Oral daily  NIFEdipine XL 30 milliGRAM(s) Oral daily  sodium chloride 0.9%. 1000 milliLiter(s) IV Continuous <Continuous>      LABS:                          12.7   8.5   )-----------( 268      ( 2018 20:31 )             36.5     06-04    137  |  99  |  11  ----------------------------<  83  3.7   |  27  |  0.87    Ca    9.2      2018 06:23  Mg     1.5     06-04    TPro  8.1  /  Alb  3.4  /  TBili  0.6  /  DBili  x   /  AST  75<H>  /  ALT  22  /  AlkPhos  48  06-02    PT/INR - ( 2018 20:31 )   PT: 10.2 sec;   INR: 0.92          PTT - ( 2018 20:31 )  PTT:25.3 sec  Urinalysis Basic - ( 2018 21:23 )    Color: Yellow / Appearance: Clear / S.020 / pH: x  Gluc: x / Ketone: 15 mg/dL  / Bili: Small / Urobili: 0.2 E.U./dL   Blood: x / Protein: 100 mg/dL / Nitrite: NEGATIVE   Leuk Esterase: NEGATIVE / RBC: < 5 /HPF / WBC < 5 /HPF   Sq Epi: x / Non Sq Epi: 5-10 /HPF / Bacteria: Present /HPF        RADIOLOGY & ADDITIONAL TESTS:  All imaging reviewed.

## 2018-06-04 NOTE — DISCHARGE NOTE ADULT - MEDICATION SUMMARY - MEDICATIONS TO STOP TAKING
I will STOP taking the medications listed below when I get home from the hospital:    losartan-hydrochlorothiazide 50mg-12.5mg oral tablet  -- 1 tab(s) by mouth once a day

## 2018-06-04 NOTE — PHYSICAL THERAPY INITIAL EVALUATION ADULT - CRITERIA FOR SKILLED THERAPEUTIC INTERVENTIONS
predicted duration of therapy intervention/risk reduction/prevention/rehab potential/functional limitations in following categories/impairments found/anticipated discharge recommendation/therapy frequency/anticipated equipment needs at discharge

## 2018-06-04 NOTE — DISCHARGE NOTE ADULT - PATIENT PORTAL LINK FT
You can access the [facility] Patient Portal, offered by [facility], by registering with the following website: [email or web address]

## 2018-06-04 NOTE — DISCHARGE NOTE ADULT - MEDICATION SUMMARY - MEDICATIONS TO TAKE
I will START or STAY ON the medications listed below when I get home from the hospital:    aspirin 81 mg oral delayed release tablet  -- 1 tab(s) by mouth once a day MDD:1 tab  -- Indication: For CVA (cerebral vascular accident)    losartan 50 mg oral tablet  -- 1 tab(s) by mouth once a day MDD:1 tab daily  -- Indication: For HTN (hypertension)    ProAir HFA 90 mcg/inh inhalation aerosol  -- 2 puff(s) inhaled 4 times a day, As Needed  -- Indication: For Asthma    Procardia XL 30 mg oral tablet, extended release  -- 1 tab(s) by mouth once a day  -- Indication: For HTN (hypertension)    Singulair 10 mg oral tablet  -- 1 tab(s) by mouth once a day  -- Indication: For Asthma    magnesium oxide 400 mg (240 mg elemental magnesium) oral tablet  -- Indication: For Nutrition, metabolism, and development symptoms

## 2018-06-04 NOTE — DISCHARGE NOTE ADULT - HOSPITAL COURSE
89 yo F with PMH HTN, asthma, stroke (>10 years ago, no residual deficits) who presented with 3 falls in the day PTA.  In the ED, patient was afebrile Tm 98.1, HR 60-75, //70, RR 16 O2 sat %. Labs significant for trop 0.14, CK 1800, and hyponatremia to 127. ECG NSR with bifascicular block, no acute ischemic changes. CT head negative for ICH or fracture.  Orthostatic vital signs were positive.  She received IVF with improvement in her symptoms.  She was hydronatremic which improved with holding HCTZ.  She experienced a mild troponinemia that peaked <0.2, and was attributed to demand ischemia. Her falls led to a mild rhabdo that remained stable (CK<2k) without any renal sequelae.  For HTN, she was maintained on losartan and nifedipine XL.  Her BP tended to drop to the 110s shortly after receiving her morning medications, so despite running high in the evenings (to systolic 170s, asymptomatic) her regimen was not changed to avoid hypotension. She was evaluated by PT who reccomended [facility] 89 yo F with PMH HTN, asthma, stroke (>10 years ago, no residual deficits) who presented with 3 falls in the day PTA.  In the ED, patient was afebrile Tm 98.1, HR 60-75, //70, RR 16 O2 sat %. Labs significant for trop 0.14, CK 1800, and hyponatremia to 127. ECG NSR with bifascicular block, no acute ischemic changes. CT head negative for ICH or fracture.  Orthostatic vital signs were positive.  She received IVF with improvement in her symptoms.  She was hydronatremic which improved with holding HCTZ.  She was hypomagnesemic and repleted with IV magnesium. She experienced a mild troponinemia that peaked <0.2, and was attributed to demand ischemia. Her falls led to a mild rhabdo that remained stable (CK<2k) without any renal sequelae.  For HTN, she was maintained on losartan and nifedipine XL.  Her BP tended to drop to the 110s shortly after receiving her morning medications, so despite running high in the evenings (to systolic 170s, asymptomatic) her regimen was not changed to avoid hypotension. She was evaluated by PT who reccomended [facility]

## 2018-06-04 NOTE — DISCHARGE NOTE ADULT - ADDITIONAL INSTRUCTIONS
Stop taking hydrochlorothiazide.  Eat three good meals per day.  Drink plenty of water (approximately 64 ounces per day).  Follow up with your PMD.

## 2018-06-04 NOTE — PHYSICAL THERAPY INITIAL EVALUATION ADULT - PERTINENT HX OF CURRENT PROBLEM, REHAB EVAL
Patient is a 89 y/o F s/p mechanical fall in the home with additional c/o intermittent chest tightness.

## 2018-06-04 NOTE — DISCHARGE NOTE ADULT - CARE PLAN
Principal Discharge DX:	Orthostatic hypotension  Goal:	prevent recurrence  Assessment and plan of treatment:	You were admitted with orthostatic hypotension, which is where your blood pressure drops when going from sitting to standing.  The cause of your orthostatic hypotension was likely dehydration.  Drink plenty of fluids and eat three good meals per day.  Stop taking hydrochlorothiazide.  Follow up with your PMD within one week  Secondary Diagnosis:	Asthma, unspecified asthma severity, unspecified whether complicated, unspecified whether persistent  Assessment and plan of treatment:	Continue taking singulair and albuterol as directed.  Secondary Diagnosis:	Cerebrovascular accident (CVA), unspecified mechanism  Assessment and plan of treatment:	follow up with your pmd.  Secondary Diagnosis:	Essential hypertension  Assessment and plan of treatment:	continue taking losartan and nifedipine as directed.  STOP TAKING HYDROCHLOROTHIAZIDE  Secondary Diagnosis:	Traumatic rhabdomyolysis, initial encounter  Assessment and plan of treatment:	follow up with your pmd.  Secondary Diagnosis:	Hyponatremia  Assessment and plan of treatment:	You presented with hyponatremia which is a low level of sodium in your blood.  This was likely caused by dehydration and a blood pressure medication you were taking, hydrochlorothiazide (HCTZ).  STOP TAKING HYDROCHLOROTHIAZIDE.  Eat three good meals per day. Principal Discharge DX:	Orthostatic hypotension  Goal:	prevent recurrence  Assessment and plan of treatment:	You were admitted with orthostatic hypotension, which is where your blood pressure drops when going from sitting to standing.  The cause of your orthostatic hypotension was likely dehydration.  Drink plenty of fluids and eat three good meals per day.  Stop taking hydrochlorothiazide.  Follow up with your PMD within one week  Secondary Diagnosis:	Asthma, unspecified asthma severity, unspecified whether complicated, unspecified whether persistent  Assessment and plan of treatment:	Continue taking singulair and albuterol as directed.  Secondary Diagnosis:	Cerebrovascular accident (CVA), unspecified mechanism  Assessment and plan of treatment:	follow up with your pmd.  Secondary Diagnosis:	Essential hypertension  Assessment and plan of treatment:	continue taking losartan and nifedipine as directed.  STOP TAKING HYDROCHLOROTHIAZIDE  Secondary Diagnosis:	Traumatic rhabdomyolysis, initial encounter  Assessment and plan of treatment:	Rhabdomyolysis is a breakdown of muscle.  You experienced a mild rhabdomyolysis due to your falls.  There were no significant sequelae of this.  follow up with your pmd.  Secondary Diagnosis:	Hyponatremia  Assessment and plan of treatment:	You presented with hyponatremia which is a low level of sodium in your blood.  This was likely caused by dehydration and a blood pressure medication you were taking, hydrochlorothiazide (HCTZ).  STOP TAKING HYDROCHLOROTHIAZIDE.  Eat three good meals per day.

## 2018-06-04 NOTE — DISCHARGE NOTE ADULT - COMMUNITY RESOURCES
Discharge to: [facility], [address] [address] p: [phone]

## 2018-06-04 NOTE — DISCHARGE NOTE ADULT - CARE PROVIDER_API CALL
Beverly Neff), Cardiology; Internal Medicine  [address] [place] [address]  Phone: [phone]  Fax: [phone]

## 2018-06-05 LAB
ANION GAP SERPL CALC-SCNC: 7 MMOL/L — SIGNIFICANT CHANGE UP (ref 5–17)
BUN SERPL-MCNC: 11 MG/DL — SIGNIFICANT CHANGE UP (ref 7–23)
CALCIUM SERPL-MCNC: 9.1 MG/DL — SIGNIFICANT CHANGE UP (ref 8.4–10.5)
CHLORIDE SERPL-SCNC: 98 MMOL/L — SIGNIFICANT CHANGE UP (ref 96–108)
CO2 SERPL-SCNC: 27 MMOL/L — SIGNIFICANT CHANGE UP (ref 22–31)
CREAT SERPL-MCNC: 0.97 MG/DL — SIGNIFICANT CHANGE UP (ref 0.5–1.3)
GLUCOSE SERPL-MCNC: 133 MG/DL — HIGH (ref 70–99)
MAGNESIUM SERPL-MCNC: 1.5 MG/DL — LOW (ref 1.6–2.6)
POTASSIUM SERPL-MCNC: 3.7 MMOL/L — SIGNIFICANT CHANGE UP (ref 3.5–5.3)
POTASSIUM SERPL-SCNC: 3.7 MMOL/L — SIGNIFICANT CHANGE UP (ref 3.5–5.3)
SODIUM SERPL-SCNC: 132 MMOL/L — LOW (ref 135–145)

## 2018-06-05 PROCEDURE — 93010 ELECTROCARDIOGRAM REPORT: CPT

## 2018-06-05 PROCEDURE — 99233 SBSQ HOSP IP/OBS HIGH 50: CPT

## 2018-06-05 RX ORDER — MAGNESIUM SULFATE 500 MG/ML
2 VIAL (ML) INJECTION ONCE
Qty: 0 | Refills: 0 | Status: COMPLETED | OUTPATIENT
Start: 2018-06-05 | End: 2018-06-05

## 2018-06-05 RX ORDER — ACETAMINOPHEN 500 MG
650 TABLET ORAL ONCE
Qty: 0 | Refills: 0 | Status: COMPLETED | OUTPATIENT
Start: 2018-06-05 | End: 2018-06-05

## 2018-06-05 RX ORDER — MAGNESIUM SULFATE 500 MG/ML
3 VIAL (ML) INJECTION ONCE
Qty: 0 | Refills: 0 | Status: DISCONTINUED | OUTPATIENT
Start: 2018-06-05 | End: 2018-06-05

## 2018-06-05 RX ORDER — POTASSIUM CHLORIDE 20 MEQ
40 PACKET (EA) ORAL ONCE
Qty: 0 | Refills: 0 | Status: COMPLETED | OUTPATIENT
Start: 2018-06-05 | End: 2018-06-05

## 2018-06-05 RX ADMIN — Medication 650 MILLIGRAM(S): at 07:06

## 2018-06-05 RX ADMIN — Medication 30 MILLIGRAM(S): at 11:17

## 2018-06-05 RX ADMIN — HEPARIN SODIUM 5000 UNIT(S): 5000 INJECTION INTRAVENOUS; SUBCUTANEOUS at 05:27

## 2018-06-05 RX ADMIN — LOSARTAN POTASSIUM 50 MILLIGRAM(S): 100 TABLET, FILM COATED ORAL at 05:27

## 2018-06-05 RX ADMIN — Medication 50 GRAM(S): at 10:44

## 2018-06-05 RX ADMIN — Medication 81 MILLIGRAM(S): at 11:17

## 2018-06-05 RX ADMIN — HEPARIN SODIUM 5000 UNIT(S): 5000 INJECTION INTRAVENOUS; SUBCUTANEOUS at 21:04

## 2018-06-05 RX ADMIN — Medication 40 MILLIEQUIVALENT(S): at 10:45

## 2018-06-05 RX ADMIN — HEPARIN SODIUM 5000 UNIT(S): 5000 INJECTION INTRAVENOUS; SUBCUTANEOUS at 13:31

## 2018-06-05 RX ADMIN — MONTELUKAST 10 MILLIGRAM(S): 4 TABLET, CHEWABLE ORAL at 11:17

## 2018-06-05 RX ADMIN — Medication 650 MILLIGRAM(S): at 08:03

## 2018-06-05 NOTE — PROGRESS NOTE ADULT - SUBJECTIVE AND OBJECTIVE BOX
INTERVAL/OVERNIGHT EVENTS:  No acute overnight events.       SUBJECTIVE:  Seen and examined at bedside.  Reports mild upper back pain from the uncomfortable bed.  Denies CP, palpitations, SOB, cough, lightheadedness, diaphoresis, abd pain, N/V/D/C, edema    ROS otherwise negative.    VITAL SIGNS:  T(F): 99.1 (06-05-18 @ 10:10)  HR: 72 (06-05-18 @ 10:27)  BP: 156/79 (06-05-18 @ 10:27)  RR: 26 (06-05-18 @ 10:27)  SpO2: 95% (06-05-18 @ 08:26)  Wt(kg): --    PHYSICAL EXAM:  	Constitutional: Elderly female resting comfortably in bed; NAD, ox2 (not time)  	Head: NC/AT  	Eyes: PERRL, EOMI, clear conjunctiva  	ENT: no nasal discharge; uvula midline, no oropharyngeal erythema or exudates; MMM  	Neck: supple; no JVD  	Respiratory: CTA B/L; no W/R/R, decreased breath sounds at bases.   	Cardiac: +S1/S2; RRR; no M/R/G  	Gastrointestinal: soft, NT/ND; no rebound or guarding; +BS  	Extremities: WWP, no clubbing or cyanosis; no peripheral edema  	Vascular: 2+ radial pulses B/L  	Dermatologic: skin warm, dry and intact; no rashes, wounds, or scars  Neurologic: AAOx2; oriented to person and place but not year.      MEDICATIONS  (STANDING):  aspirin enteric coated 81 milliGRAM(s) Oral daily  heparin  Injectable 5000 Unit(s) SubCutaneous every 8 hours  losartan 50 milliGRAM(s) Oral daily  montelukast 10 milliGRAM(s) Oral daily  NIFEdipine XL 30 milliGRAM(s) Oral daily    MEDICATIONS  (PRN):  ALBUTerol    90 MICROgram(s) HFA Inhaler 2 Puff(s) Inhalation four times a day PRN Shortness of Breath and/or Wheezing      Allergies    No Known Allergies    Intolerances      ALBUTerol    90 MICROgram(s) HFA Inhaler 2 Puff(s) Inhalation four times a day PRN  aspirin enteric coated 81 milliGRAM(s) Oral daily  heparin  Injectable 5000 Unit(s) SubCutaneous every 8 hours  losartan 50 milliGRAM(s) Oral daily  montelukast 10 milliGRAM(s) Oral daily  NIFEdipine XL 30 milliGRAM(s) Oral daily      LABS:      06-05    132<L>  |  98  |  11  ----------------------------<  133<H>  3.7   |  27  |  0.97    Ca    9.1      05 Jun 2018 08:36  Mg     1.5     06-05            RADIOLOGY & ADDITIONAL TESTS:  All imaging reviewed.

## 2018-06-06 VITALS — TEMPERATURE: 99 F

## 2018-06-06 PROCEDURE — 80061 LIPID PANEL: CPT

## 2018-06-06 PROCEDURE — 84443 ASSAY THYROID STIM HORMONE: CPT

## 2018-06-06 PROCEDURE — 80048 BASIC METABOLIC PNL TOTAL CA: CPT

## 2018-06-06 PROCEDURE — 97530 THERAPEUTIC ACTIVITIES: CPT

## 2018-06-06 PROCEDURE — 80053 COMPREHEN METABOLIC PANEL: CPT

## 2018-06-06 PROCEDURE — 72170 X-RAY EXAM OF PELVIS: CPT

## 2018-06-06 PROCEDURE — 99285 EMERGENCY DEPT VISIT HI MDM: CPT | Mod: 25

## 2018-06-06 PROCEDURE — 36415 COLL VENOUS BLD VENIPUNCTURE: CPT

## 2018-06-06 PROCEDURE — 82553 CREATINE MB FRACTION: CPT

## 2018-06-06 PROCEDURE — 85025 COMPLETE CBC W/AUTO DIFF WBC: CPT

## 2018-06-06 PROCEDURE — 81001 URINALYSIS AUTO W/SCOPE: CPT

## 2018-06-06 PROCEDURE — 93005 ELECTROCARDIOGRAM TRACING: CPT

## 2018-06-06 PROCEDURE — 85730 THROMBOPLASTIN TIME PARTIAL: CPT

## 2018-06-06 PROCEDURE — 70450 CT HEAD/BRAIN W/O DYE: CPT

## 2018-06-06 PROCEDURE — 71045 X-RAY EXAM CHEST 1 VIEW: CPT

## 2018-06-06 PROCEDURE — 84484 ASSAY OF TROPONIN QUANT: CPT

## 2018-06-06 PROCEDURE — 87086 URINE CULTURE/COLONY COUNT: CPT

## 2018-06-06 PROCEDURE — 85610 PROTHROMBIN TIME: CPT

## 2018-06-06 PROCEDURE — 97162 PT EVAL MOD COMPLEX 30 MIN: CPT

## 2018-06-06 PROCEDURE — 99239 HOSP IP/OBS DSCHRG MGMT >30: CPT

## 2018-06-06 PROCEDURE — 84132 ASSAY OF SERUM POTASSIUM: CPT

## 2018-06-06 PROCEDURE — 82550 ASSAY OF CK (CPK): CPT

## 2018-06-06 PROCEDURE — 83735 ASSAY OF MAGNESIUM: CPT

## 2018-06-06 RX ADMIN — MONTELUKAST 10 MILLIGRAM(S): 4 TABLET, CHEWABLE ORAL at 09:03

## 2018-06-06 RX ADMIN — Medication 30 MILLIGRAM(S): at 09:03

## 2018-06-06 RX ADMIN — HEPARIN SODIUM 5000 UNIT(S): 5000 INJECTION INTRAVENOUS; SUBCUTANEOUS at 06:07

## 2018-06-06 RX ADMIN — Medication 81 MILLIGRAM(S): at 09:03

## 2018-06-06 RX ADMIN — LOSARTAN POTASSIUM 50 MILLIGRAM(S): 100 TABLET, FILM COATED ORAL at 06:07

## 2018-06-06 NOTE — PROGRESS NOTE ADULT - SUBJECTIVE AND OBJECTIVE BOX
INTERVAL/OVERNIGHT EVENTS:  No acute overnight events.     SUBJECTIVE:  Seen and examined at bedside.  Reports feeling well.  Denies CP, palpitations, lightheadedness, SOB, cough, F/C, abd pain, N/V/D/C.    ROS otherwise negative.    VITAL SIGNS:  T(F): 98.9 (06-06-18 @ 10:00)  HR: 82 (06-06-18 @ 09:05)  BP: 136/57 (06-06-18 @ 09:05)  RR: 16 (06-06-18 @ 09:05)  SpO2: 95% (06-06-18 @ 09:05)  Wt(kg): --    PHYSICAL EXAM:    	Constitutional: Elderly female resting comfortably in bed; NAD, ox2 (not time)  	Head: NC/AT  	Eyes: PERRL, EOMI, clear conjunctiva  	ENT: no nasal discharge; uvula midline, no oropharyngeal erythema or exudates; MMM  	Neck: supple; no JVD  	Respiratory: CTA B/L; no W/R/R, decreased breath sounds at bases.   	Cardiac: +S1/S2; RRR; no M/R/G  	Gastrointestinal: soft, NT/ND; no rebound or guarding; +BS  	Extremities: WWP, no clubbing or cyanosis; no peripheral edema  	Vascular: 2+ radial pulses B/L  	Dermatologic: skin warm, dry and intact; no rashes, wounds, or scars  Neurologic: AAOx2; oriented to person and place but not year.    MEDICATIONS  (STANDING):  aspirin enteric coated 81 milliGRAM(s) Oral daily  heparin  Injectable 5000 Unit(s) SubCutaneous every 8 hours  losartan 50 milliGRAM(s) Oral daily  montelukast 10 milliGRAM(s) Oral daily  NIFEdipine XL 30 milliGRAM(s) Oral daily    MEDICATIONS  (PRN):  ALBUTerol    90 MICROgram(s) HFA Inhaler 2 Puff(s) Inhalation four times a day PRN Shortness of Breath and/or Wheezing      Allergies    No Known Allergies    Intolerances      ALBUTerol    90 MICROgram(s) HFA Inhaler 2 Puff(s) Inhalation four times a day PRN  aspirin enteric coated 81 milliGRAM(s) Oral daily  heparin  Injectable 5000 Unit(s) SubCutaneous every 8 hours  losartan 50 milliGRAM(s) Oral daily  montelukast 10 milliGRAM(s) Oral daily  NIFEdipine XL 30 milliGRAM(s) Oral daily      LABS:      06-05    132<L>  |  98  |  11  ----------------------------<  133<H>  3.7   |  27  |  0.97    Ca    9.1      05 Jun 2018 08:36  Mg     1.5     06-05            RADIOLOGY & ADDITIONAL TESTS:  All imaging reviewed.

## 2018-06-06 NOTE — PROGRESS NOTE ADULT - PROBLEM SELECTOR PLAN 3
-Improved.  Likely 2/2 dehydration and HCTZ.  Continue to hold HCTZ.  Encourage PO intake.  D/herson IVF
-Resolved.  Likely 2/2 dehydration and HCTZ.  Continue to hold HCTZ.  Encourage PO intake.  D/herson IVF
-Improved.  Likely 2/2 dehydration and HCTZ.  Continue to hold HCTZ.  Encourage PO intake.  D/herson IVF

## 2018-06-06 NOTE — PROGRESS NOTE ADULT - PROBLEM SELECTOR PLAN 1
-Resolved. In the setting of N/V.  Nausea resolved.  D/c'ed IVF 2/2 hypertension.  Denies lightheadedness, palpitations.
-In the setting of N/V.  Nausea improved.  D/c'ed IVF 2/2 hypertension.  F/u repeat orthostatics.  Denies lightheadedness, palpitations.
-In the setting of N/V.  Nausea resolved.  D/c'ed IVF 2/2 hypertension.  F/u repeat orthostatics.  Denies lightheadedness, palpitations.

## 2018-06-06 NOTE — PROGRESS NOTE ADULT - PROBLEM SELECTOR PLAN 6
Currently normotensive.  Hold home HCTZ  C/w nifedipine and losartan    #asthma  not in acute exacerbation  albuterol PRN  c/w home singulair

## 2018-06-06 NOTE — PROGRESS NOTE ADULT - PROBLEM SELECTOR PLAN 4
Trop peaked at 0.14. EKG nonischemic.  CP resolved.    -S/p ASA 300mg in ED  -ASA 81mg daily

## 2018-06-06 NOTE — PROGRESS NOTE ADULT - PROBLEM SELECTOR PLAN 2
Likely 2/2 orthostatic hypotension and deconditioning.  D/herson IVF  PT recc KEIKO  hold HCTZ
Likely 2/2 orthostatic hypotension and deconditioning.  D/herson IVF  f/u PT eval  hold HCTZ
Likely 2/2 orthostatic hypotension and deconditioning.  D/herson IVF  PT recc KEIKO  hold HCTZ

## 2018-06-06 NOTE — PROGRESS NOTE ADULT - PROBLEM SELECTOR PLAN 8
F: none  E: Replete PRN  N: DASH diet    FULL CODE

## 2018-06-06 NOTE — PROGRESS NOTE ADULT - PROBLEM SELECTOR PLAN 5
Stable, mild.  Creatinine stable.  No longer trending.   -s/p 1L NS + maintenance IVF.
Stable, mild.  Trend CK.  -s/p 1L NS + maintenance IVF.  Cr stable.
Stable, mild.  Creatinine stable.  -s/p 1L NS + maintenance IVF.

## 2018-06-19 DIAGNOSIS — E87.8 OTHER DISORDERS OF ELECTROLYTE AND FLUID BALANCE, NOT ELSEWHERE CLASSIFIED: ICD-10-CM

## 2018-06-19 DIAGNOSIS — I10 ESSENTIAL (PRIMARY) HYPERTENSION: ICD-10-CM
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
## 2018-06-19 DIAGNOSIS — E87.1 HYPO-OSMOLALITY AND HYPONATREMIA: ICD-10-CM

## 2018-06-19 DIAGNOSIS — Z86.73 PERSONAL HISTORY OF TRANSIENT ISCHEMIC ATTACK (TIA), AND CEREBRAL INFARCTION WITHOUT RESIDUAL DEFICITS: ICD-10-CM

## 2018-06-19 DIAGNOSIS — J45.909 UNSPECIFIED ASTHMA, UNCOMPLICATED: ICD-10-CM
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
## 2018-06-19 DIAGNOSIS — E86.0 DEHYDRATION: ICD-10-CM

## 2018-06-19 DIAGNOSIS — I45.2 BIFASCICULAR BLOCK: ICD-10-CM

## 2018-06-19 DIAGNOSIS — R29.6 REPEATED FALLS: ICD-10-CM

## 2018-06-19 DIAGNOSIS — R55 SYNCOPE AND COLLAPSE: ICD-10-CM

## 2018-06-19 DIAGNOSIS — T79.6XXA TRAUMATIC ISCHEMIA OF MUSCLE, INITIAL ENCOUNTER: ICD-10-CM

## 2018-06-19 DIAGNOSIS — E83.42 HYPOMAGNESEMIA: ICD-10-CM

## 2018-06-19 DIAGNOSIS — I95.1 ORTHOSTATIC HYPOTENSION: ICD-10-CM

## 2018-08-31 ENCOUNTER — INPATIENT (INPATIENT)
Facility: HOSPITAL | Age: 83
LOS: 6 days | Discharge: NURSING, REHAB OR LONG-TERM CARE | DRG: 866 | End: 2018-09-07
Attending: INTERNAL MEDICINE | Admitting: INTERNAL MEDICINE
Payer: MEDICARE

## 2018-08-31 ENCOUNTER — TRANSCRIPTION ENCOUNTER (OUTPATIENT)
Age: 83
End: 2018-08-31

## 2018-08-31 VITALS
DIASTOLIC BLOOD PRESSURE: 81 MMHG | HEART RATE: 80 BPM | TEMPERATURE: 98 F | OXYGEN SATURATION: 96 % | RESPIRATION RATE: 18 BRPM | SYSTOLIC BLOOD PRESSURE: 148 MMHG

## 2018-08-31 DIAGNOSIS — B33.8 OTHER SPECIFIED VIRAL DISEASES: ICD-10-CM

## 2018-08-31 DIAGNOSIS — E87.1 HYPO-OSMOLALITY AND HYPONATREMIA: ICD-10-CM

## 2018-08-31 DIAGNOSIS — Z29.9 ENCOUNTER FOR PROPHYLACTIC MEASURES, UNSPECIFIED: ICD-10-CM

## 2018-08-31 DIAGNOSIS — I63.9 CEREBRAL INFARCTION, UNSPECIFIED: ICD-10-CM

## 2018-08-31 DIAGNOSIS — R63.8 OTHER SYMPTOMS AND SIGNS CONCERNING FOOD AND FLUID INTAKE: ICD-10-CM

## 2018-08-31 DIAGNOSIS — R29.6 REPEATED FALLS: ICD-10-CM

## 2018-08-31 DIAGNOSIS — H26.9 UNSPECIFIED CATARACT: Chronic | ICD-10-CM

## 2018-08-31 DIAGNOSIS — J45.909 UNSPECIFIED ASTHMA, UNCOMPLICATED: ICD-10-CM

## 2018-08-31 DIAGNOSIS — I10 ESSENTIAL (PRIMARY) HYPERTENSION: ICD-10-CM

## 2018-08-31 LAB
ALBUMIN SERPL ELPH-MCNC: 3.3 G/DL — SIGNIFICANT CHANGE UP (ref 3.3–5)
ALP SERPL-CCNC: 48 U/L — SIGNIFICANT CHANGE UP (ref 40–120)
ALT FLD-CCNC: 17 U/L — SIGNIFICANT CHANGE UP (ref 10–45)
ANION GAP SERPL CALC-SCNC: 13 MMOL/L — SIGNIFICANT CHANGE UP (ref 5–17)
AST SERPL-CCNC: 46 U/L — HIGH (ref 10–40)
BASOPHILS NFR BLD AUTO: 0.3 % — SIGNIFICANT CHANGE UP (ref 0–2)
BILIRUB SERPL-MCNC: 0.4 MG/DL — SIGNIFICANT CHANGE UP (ref 0.2–1.2)
BUN SERPL-MCNC: 21 MG/DL — SIGNIFICANT CHANGE UP (ref 7–23)
CALCIUM SERPL-MCNC: 8.8 MG/DL — SIGNIFICANT CHANGE UP (ref 8.4–10.5)
CHLORIDE SERPL-SCNC: 86 MMOL/L — LOW (ref 96–108)
CO2 SERPL-SCNC: 27 MMOL/L — SIGNIFICANT CHANGE UP (ref 22–31)
CREAT SERPL-MCNC: 1.06 MG/DL — SIGNIFICANT CHANGE UP (ref 0.5–1.3)
GLUCOSE SERPL-MCNC: 116 MG/DL — HIGH (ref 70–99)
HCT VFR BLD CALC: 36.6 % — SIGNIFICANT CHANGE UP (ref 34.5–45)
HGB BLD-MCNC: 12.6 G/DL — SIGNIFICANT CHANGE UP (ref 11.5–15.5)
HPIV3 RNA SPEC QL NAA+PROBE: DETECTED
LACTATE SERPL-SCNC: 1.5 MMOL/L — SIGNIFICANT CHANGE UP (ref 0.5–2)
LYMPHOCYTES # BLD AUTO: 21.1 % — SIGNIFICANT CHANGE UP (ref 13–44)
MAGNESIUM SERPL-MCNC: 1.7 MG/DL — SIGNIFICANT CHANGE UP (ref 1.6–2.6)
MCHC RBC-ENTMCNC: 28.1 PG — SIGNIFICANT CHANGE UP (ref 27–34)
MCHC RBC-ENTMCNC: 34.4 G/DL — SIGNIFICANT CHANGE UP (ref 32–36)
MCV RBC AUTO: 81.5 FL — SIGNIFICANT CHANGE UP (ref 80–100)
MONOCYTES NFR BLD AUTO: 9.7 % — SIGNIFICANT CHANGE UP (ref 2–14)
NEUTROPHILS NFR BLD AUTO: 68.9 % — SIGNIFICANT CHANGE UP (ref 43–77)
PLATELET # BLD AUTO: 206 K/UL — SIGNIFICANT CHANGE UP (ref 150–400)
POTASSIUM SERPL-MCNC: 3.3 MMOL/L — LOW (ref 3.5–5.3)
POTASSIUM SERPL-SCNC: 3.3 MMOL/L — LOW (ref 3.5–5.3)
PROT SERPL-MCNC: 8 G/DL — SIGNIFICANT CHANGE UP (ref 6–8.3)
RAPID RVP RESULT: DETECTED
RBC # BLD: 4.49 M/UL — SIGNIFICANT CHANGE UP (ref 3.8–5.2)
RBC # FLD: 13.3 % — SIGNIFICANT CHANGE UP (ref 10.3–16.9)
SODIUM SERPL-SCNC: 126 MMOL/L — LOW (ref 135–145)
TROPONIN T SERPL-MCNC: <0.01 NG/ML — SIGNIFICANT CHANGE UP (ref 0–0.01)
WBC # BLD: 7.6 K/UL — SIGNIFICANT CHANGE UP (ref 3.8–10.5)
WBC # FLD AUTO: 7.6 K/UL — SIGNIFICANT CHANGE UP (ref 3.8–10.5)

## 2018-08-31 PROCEDURE — 71046 X-RAY EXAM CHEST 2 VIEWS: CPT | Mod: 26

## 2018-08-31 PROCEDURE — 70450 CT HEAD/BRAIN W/O DYE: CPT | Mod: 26

## 2018-08-31 PROCEDURE — 99285 EMERGENCY DEPT VISIT HI MDM: CPT | Mod: 25

## 2018-08-31 PROCEDURE — 93010 ELECTROCARDIOGRAM REPORT: CPT

## 2018-08-31 RX ORDER — HEPARIN SODIUM 5000 [USP'U]/ML
5000 INJECTION INTRAVENOUS; SUBCUTANEOUS EVERY 12 HOURS
Qty: 0 | Refills: 0 | Status: DISCONTINUED | OUTPATIENT
Start: 2018-08-31 | End: 2018-09-07

## 2018-08-31 RX ORDER — ALBUTEROL 90 UG/1
2 AEROSOL, METERED ORAL
Qty: 0 | Refills: 0 | COMMUNITY

## 2018-08-31 RX ORDER — MAGNESIUM SULFATE 500 MG/ML
1 VIAL (ML) INJECTION ONCE
Qty: 0 | Refills: 0 | Status: COMPLETED | OUTPATIENT
Start: 2018-08-31 | End: 2018-08-31

## 2018-08-31 RX ORDER — IPRATROPIUM/ALBUTEROL SULFATE 18-103MCG
3 AEROSOL WITH ADAPTER (GRAM) INHALATION EVERY 6 HOURS
Qty: 0 | Refills: 0 | Status: DISCONTINUED | OUTPATIENT
Start: 2018-08-31 | End: 2018-09-07

## 2018-08-31 RX ORDER — HEPARIN SODIUM 5000 [USP'U]/ML
5000 INJECTION INTRAVENOUS; SUBCUTANEOUS EVERY 8 HOURS
Qty: 0 | Refills: 0 | Status: DISCONTINUED | OUTPATIENT
Start: 2018-08-31 | End: 2018-08-31

## 2018-08-31 RX ORDER — NIFEDIPINE 30 MG
1 TABLET, EXTENDED RELEASE 24 HR ORAL
Qty: 0 | Refills: 0 | COMMUNITY

## 2018-08-31 RX ORDER — MAGNESIUM OXIDE 400 MG ORAL TABLET 241.3 MG
0 TABLET ORAL
Qty: 0 | Refills: 0 | COMMUNITY

## 2018-08-31 RX ORDER — SODIUM CHLORIDE 9 MG/ML
1000 INJECTION INTRAMUSCULAR; INTRAVENOUS; SUBCUTANEOUS
Qty: 0 | Refills: 0 | Status: DISCONTINUED | OUTPATIENT
Start: 2018-08-31 | End: 2018-09-01

## 2018-08-31 RX ORDER — MONTELUKAST 4 MG/1
1 TABLET, CHEWABLE ORAL
Qty: 0 | Refills: 0 | COMMUNITY

## 2018-08-31 RX ORDER — POTASSIUM CHLORIDE 20 MEQ
40 PACKET (EA) ORAL ONCE
Qty: 0 | Refills: 0 | Status: COMPLETED | OUTPATIENT
Start: 2018-08-31 | End: 2018-08-31

## 2018-08-31 RX ORDER — POTASSIUM CHLORIDE 20 MEQ
20 PACKET (EA) ORAL ONCE
Qty: 0 | Refills: 0 | Status: COMPLETED | OUTPATIENT
Start: 2018-08-31 | End: 2018-08-31

## 2018-08-31 RX ORDER — SODIUM CHLORIDE 9 MG/ML
1000 INJECTION INTRAMUSCULAR; INTRAVENOUS; SUBCUTANEOUS ONCE
Qty: 0 | Refills: 0 | Status: COMPLETED | OUTPATIENT
Start: 2018-08-31 | End: 2018-08-31

## 2018-08-31 RX ADMIN — SODIUM CHLORIDE 2000 MILLILITER(S): 9 INJECTION INTRAMUSCULAR; INTRAVENOUS; SUBCUTANEOUS at 16:45

## 2018-08-31 RX ADMIN — Medication 20 MILLIEQUIVALENT(S): at 23:09

## 2018-08-31 RX ADMIN — Medication 100 MILLIGRAM(S): at 23:09

## 2018-08-31 RX ADMIN — Medication 3 MILLILITER(S): at 23:00

## 2018-08-31 RX ADMIN — Medication 100 GRAM(S): at 23:28

## 2018-08-31 RX ADMIN — SODIUM CHLORIDE 70 MILLILITER(S): 9 INJECTION INTRAMUSCULAR; INTRAVENOUS; SUBCUTANEOUS at 23:09

## 2018-08-31 RX ADMIN — Medication 40 MILLIEQUIVALENT(S): at 19:49

## 2018-08-31 RX ADMIN — HEPARIN SODIUM 5000 UNIT(S): 5000 INJECTION INTRAVENOUS; SUBCUTANEOUS at 23:09

## 2018-08-31 NOTE — ED ADULT NURSE NOTE - CHPI ED NUR SYMPTOMS NEG
no numbness/no fever/no deformity/no abrasion/no bleeding/no tingling/no loss of consciousness/no confusion/no vomiting

## 2018-08-31 NOTE — H&P ADULT - NSHPLABSRESULTS_GEN_ALL_CORE
LABS:                         12.6   7.6   )-----------( 206      ( 31 Aug 2018 16:44 )             36.6     08-31    126<L>  |  86<L>  |  21  ----------------------------<  116<H>  3.3<L>   |  27  |  1.06    Ca    8.8      31 Aug 2018 16:44    TPro  8.0  /  Alb  3.3  /  TBili  0.4  /  DBili  x   /  AST  46<H>  /  ALT  17  /  AlkPhos  48  08-31      CARDIAC MARKERS ( 31 Aug 2018 16:44 )  x     / <0.01 ng/mL / x     / x     / x        Lactate, Blood: 1.5 mmoL/L (08-31 @ 16:44)    Rapid RVP Result: Detected: The [employer] RVP Rapid uses polymerase chain reaction (PCR) and melt  curve analysis to screen for adenovirus; coronavirus HKU1, NL63, 229E,  OC43; human metapneumovirus (hMPV); human enterovirus/rhinovirus  (Entero/RV); influenza A; influenza A/H1;influenza A/H3; influenza  A/H1-2009; influenza B; parainfluenza viruses 1, 2, 3, 4; respiratory  syncytial virus; Bordetella pertussis; Mycoplasma pneumoniae; and  Chlamydophila pneumoniae. (08.31.18 @ 19:05)      RADIOLOGY, EKG & ADDITIONAL TESTS:     EKG: NSR, RBBB     CT Head No Cont (08.31.18 @ 18:12)    1. No CT evidence of acute intracranial hemorrhage.  2. 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    CXR: No areas of focal consolidation, hyperinflated lungs, clear, no pulmonary congestion

## 2018-08-31 NOTE — ED ADULT NURSE NOTE - OTHER COMPLAINTS
S/P fall, no LOC, no blood thinner. Pt also C/O cough. Pt is awake and oriented to person and place, not to time. As per pt's family member, no change in mental status from baseline.

## 2018-08-31 NOTE — ED PROVIDER NOTE - MEDICAL DECISION MAKING DETAILS
here w/ falls at home, at least 3 family is aware of, and new hyponatremia and hypokalemia - I anticipate due to poor po intake. will admit. case management aware. family at bedside and agree pt not safe to be discharged home just yet

## 2018-08-31 NOTE — H&P ADULT - PROBLEM SELECTOR PLAN 3
On admission found to be hypoNa 126, likely hypovolemic hyponatremia   - serum osm   - urine Na, urine osm  - normal saline @ 70cc/hr

## 2018-08-31 NOTE — H&P ADULT - FAMILY HISTORY
Father  Still living? Unknown  Family history of prostate cancer in father, Age at diagnosis: Age Unknown     Mother  Still living? Unknown  Family history of heart attack, Age at diagnosis: Age Unknown

## 2018-08-31 NOTE — DISCHARGE NOTE ADULT - SECONDARY DIAGNOSIS.
Hyponatremia Frequent falls Essential hypertension Nutrition, metabolism, and development symptoms Orthostatic hypotension

## 2018-08-31 NOTE — H&P ADULT - PROBLEM SELECTOR PLAN 8
DVT ppx: heparin subq   GI ppx: None needed   FULL CODE, patient is a Jehov[employer] Witness - no blood products

## 2018-08-31 NOTE — ED PROVIDER NOTE - OBJECTIVE STATEMENT
89 yo F with PMH HTN, asthma, stroke (>10 years ago, no residual deficits) presenting with 3 falls in the past week, including today out of bed. pt after last admission was living with daughter, but was demading to move back to her own home, so has been living at home alone now with family checking in on her near daily. in process of obtaining home care but not yet done. fell on tuesday, then yesterday, and then today out of bed. also w/ new cough since tuesday. 89 yo F with PMH HTN, asthma, stroke (>10 years ago, no residual deficits) presenting with 3 falls in the past week, including today out of bed. pt after last admission was living with daughter, but was demading to move back to her own home, so has been living at home alone now with family checking in on her near daily. in process of obtaining home care but not yet done. fell on tuesday, then yesterday, and then today out of bed. also w/ new cough since tuesday but no fevers/chills, no n/v. family endorses pt w/ poor po intake.

## 2018-08-31 NOTE — DISCHARGE NOTE ADULT - PATIENT PORTAL LINK FT
You can access the [facility] Patient Portal, offered by [facility], by registering with the following website: [email or web address]

## 2018-08-31 NOTE — DISCHARGE NOTE ADULT - ADDITIONAL INSTRUCTIONS
You should follow up with Dr. Beverly Bang outpatient for your orthostatic hypotension, office address and contact information are included below.

## 2018-08-31 NOTE — ED ADULT TRIAGE NOTE - OTHER COMPLAINTS
S/P fall, no LOC, no blood thinner. Pt also C/O cough. Pt is A&O x3. S/P fall, no LOC, no blood thinner. Pt also C/O cough. Pt is awake and oriented to person and place, not to time. As per pt's family member, no change in mental status from baseline.

## 2018-08-31 NOTE — DISCHARGE NOTE ADULT - PLAN OF CARE
Supportive care during infection You were admitted with weakness, frequent falls, and a cough. You were found to have parainfluenza, this is a virus which affects the upper airways. This virus can cause you to have increased cough and generally feel weaker. There is no antibiotic or antiviral that treats this infection, and care is supportive, including fluids and monitoring. We gave IV fluids during your stay and monitored for difficulty breathing, we also gave a medication to decrease your cough symptoms. You came in with a low sodium which was likely due to dehydration secondary to poor oral intake. We provided sodium in the fluids that were given and your sodium improved, this should help to improve the generalized weakness. You came from home with frequent falls. Some of this may be related to weakness resulting from your infection and dehydration. We had physical therapy evaluate you during your stay and they recommended subacute rehab. You have a history of high resting blood pressure but also a history of a blood pressure that drops quickly when going from sitting to standing. We gradually re-introduced your old blood pressure medications while treating your dehydration. We added supplements for missing electrolytes during your stay and kept you on a heart-healthy diet. You were You were found to have a condition called orthostatic hypotension, which is a blood pressure that drops quickly when going from sitting to standing. We gradually re-introduced your old blood pressure medications while treating your dehydration.

## 2018-08-31 NOTE — ED PROVIDER NOTE - PHYSICAL EXAMINATION
CONSTITUTIONAL: elderly, thin, no distress  HEAD: Normocephalic; atraumatic.   EYES:  conjunctiva and sclera clear  ENT: normal nose; no rhinorrhea; normal pharynx with no erythema or lesions.   NECK: Supple; non-tender;   CARDIOVASCULAR: Normal S1, S2; no murmurs, rubs, or gallops. Regular rate and rhythm.   RESPIRATORY: Breathing easily; breath sounds clear and equal bilaterally; no wheezes, rhonchi, or rales.  GI: Soft; non-distended; non-tender; no palpable organomegaly.   EXT: No cyanosis or edema; N/V intact  SKIN: Normal for age and race; warm; dry; good turgor; no apparent lesions or rash.   NEURO: A & O x 3; face symmetric; grossly unremarkable.   PSYCHOLOGICAL: The patient’s mood and manner are appropriate.

## 2018-08-31 NOTE — ED ADULT NURSE NOTE - OBJECTIVE STATEMENT
Pt w/ PMH of asthma, HTN and hx of CVA x10 years ago presents to ED [facility] in company of family who elicit she has fallen x3 times int he past week.  Most recent fall today w/ positive head trauma, but w/o LOC.  Pt denies dysuria, dizziness, CP, SOB or fever/chills.  Pt is on CCM pending eval by [facility] and labs.

## 2018-08-31 NOTE — H&P ADULT - ASSESSMENT
89yo F Jehovah witnesswith PMH HTN, Asthma, CVA (>10 years ago, no residual deficits), palpitations presenting from home with falls and weakness. Admitted to the regional medical floors for further management.

## 2018-08-31 NOTE — H&P ADULT - HISTORY OF PRESENT ILLNESS
In the ED VS Tmax 98.2 HR 80-84, -148/66-81, RR 18, SpO2 96% RA  In the ED Labs significant for Na 126, Cl 86, RVP positive for parainfluenza.   In the ED patient received Klor 40mEq and normal saline bolus x1L 89yo F Jehovah witnesswith PMH HTN, Asthma, CVA (>10 years ago, no residual deficits), palpitations presenting from home with falls and weakness. Daughters at bedside with patient. In the past week patient with 3 falls while using assistive device cane. She states that she had leg weakness and dizziness and then fell. She denies tripping on anything, denies LOC. Did hit her head on Tuesday with healing eye laceration after falling in the kitchen. This morning fell again out of bed. At last admission was living with daughter, but was demanding to move back to her own home, so has been living at home alone now with family checking in on her near daily. Patient endorse a dry non-productive cough since Tuesday but no fevers or chills. On Tuesday patient with several episodes of yellow non-bloody vomiting, which resolved.  Family endorses poor PO intake. Patient complaining of decreased vision in the left eye associated with L eye pain, was scheduled to see an eye doctor but unable to make the appointment.     REVIEW OF SYSTEMS:    CONSTITUTIONAL: +weakness, dizziness, weight loss (unable to quantify), No fevers or chills  EYES/ENT: + visual changes in the L eye;  No vertigo or throat pain   NECK: No pain or stiffness  RESPIRATORY: + dry cough, shortness of breath,  No wheezing, hemoptysis  CARDIOVASCULAR: No chest pain or palpitations  GASTROINTESTINAL: +nausea, vomiting,  No abdominal or epigastric pain. No hematemesis; No diarrhea or constipation. No melena or hematochezia.  GENITOURINARY: No dysuria, frequency or hematuria  NEUROLOGICAL: No numbness   SKIN: No itching, rashes    In the ED VS Tmax 98.2 HR 80-84, -148/66-81, RR 18, SpO2 96% RA  In the ED Labs significant for Na 126, Cl 86, RVP positive for parainfluenza.   In the ED patient received Klor 40mEq and normal saline bolus x1L 89yo F Jehovah witness with PMH HTN, Asthma, CVA (>10 years ago, no residual deficits), palpitations presenting from home with falls and weakness. Daughters at bedside with patient. In the past week patient with 3 falls while using assistive device cane. She states that she had leg weakness and dizziness and then fell. She denies tripping on anything, denies LOC. Did hit her head on Tuesday with healing eye laceration after falling in the kitchen. This morning fell again out of bed. At last admission was living with daughter, but was demanding to move back to her own home, so has been living at home alone now with family checking in on her near daily. Patient endorse a dry non-productive cough since Tuesday but no fevers or chills. On Tuesday patient with several episodes of yellow non-bloody vomiting, which resolved.  Family endorses poor PO intake. Patient complaining of decreased vision in the left eye associated with L eye pain, was scheduled to see an eye doctor but unable to make the appointment.     REVIEW OF SYSTEMS:    CONSTITUTIONAL: +weakness, dizziness, weight loss (unable to quantify), No fevers or chills  EYES/ENT: + visual changes in the L eye;  No vertigo or throat pain   NECK: No pain or stiffness  RESPIRATORY: + dry cough, shortness of breath,  No wheezing, hemoptysis  CARDIOVASCULAR: No chest pain or palpitations  GASTROINTESTINAL: +nausea, vomiting,  No abdominal or epigastric pain. No hematemesis; No diarrhea or constipation. No melena or hematochezia.  GENITOURINARY: No dysuria, frequency or hematuria  NEUROLOGICAL: No numbness   SKIN: No itching, rashes    In the ED VS Tmax 98.2 HR 80-84, -148/66-81, RR 18, SpO2 96% RA  In the ED Labs significant for Na 126, Cl 86, RVP positive for parainfluenza.   In the ED patient received Klor 40mEq and normal saline bolus x1L

## 2018-08-31 NOTE — ED ADULT TRIAGE NOTE - CHIEF COMPLAINT QUOTE
Pt's family member states "she is feeling weak. She fell on Tuesday and today. She didn't hit her head on Tuesday, but she did today."

## 2018-08-31 NOTE — H&P ADULT - PROBLEM SELECTOR PLAN 1
Multiple falls over the past 3 days in setting of vomiting, decreased PO intake and diuretic use. Patient complains or dizziness and weakness. Found to be + parainfluenza virus, hyponatremic 126, hypo K 3.3 and hypoCl 86. Falls likely 2/2 weakness in the setting of infection, poor PO intake and vomiting. Patient denies CP or palpitation prior to fall making cardiogenic etiology less likely, negative troponin on admission. Denies LOC. CT Head with no evidence of acute intracranial hemorrhage.  - AM TSH, folate, vitamin B12   - f/u Orthostatics   - Hold hydrochlorothiazide in the setting of dehydration

## 2018-08-31 NOTE — ED ADULT NURSE NOTE - NSIMPLEMENTINTERV_GEN_ALL_ED
Implemented All Fall Risk Interventions:  [place] to call system. Call bell, personal items and telephone within reach. Instruct patient to call for assistance. Room bathroom lighting operational. Non-slip footwear when patient is off stretcher. Physically safe environment: no spills, clutter or unnecessary equipment. Stretcher in lowest position, wheels locked, appropriate side rails in place. Provide visual cue, wrist band, yellow gown, etc. Monitor gait and stability. Monitor for mental status changes and reorient to person, place, and time. Review medications for side effects contributing to fall risk. Reinforce activity limits and safety measures with patient and family.

## 2018-08-31 NOTE — H&P ADULT - NSHPSOCIALHISTORY_GEN_ALL_CORE
Lives home alone, uses cane or walker as assistive device   Denies smoking, alcohol or recreational drug use

## 2018-08-31 NOTE — DISCHARGE NOTE ADULT - CARE PROVIDER_API CALL
Beverly Neff), Cardiology; Internal Medicine  [address] [place] [address]  Phone: [phone]  Fax: [phone]

## 2018-08-31 NOTE — H&P ADULT - NSHPPHYSICALEXAM_GEN_ALL_CORE
.  VITAL SIGNS:  T(C): 36.8 (08-31-18 @ 18:44), Max: 36.8 (08-31-18 @ 18:44)  T(F): 98.2 (08-31-18 @ 18:44), Max: 98.2 (08-31-18 @ 18:44)  HR: 88 (08-31-18 @ 20:17) (80 - 88)  BP: 168/72 (08-31-18 @ 20:17) (148/81 - 170/66)  BP(mean): --  RR: 18 (08-31-18 @ 20:17) (18 - 18)  SpO2: 95% (08-31-18 @ 20:17) (95% - 96%)  Wt(kg): --    PHYSICAL EXAM:    Constitutional: WDWN resting comfortably in bed; NAD  Head: NC/AT  Eyes: PERRL, EOMI, anicteric sclera  ENT: no nasal discharge; uvula midline, no oropharyngeal erythema or exudates; MMM  Neck: supple; no JVD or thyromegaly  Respiratory: CTA B/L; no W/R/R, no retractions  Cardiac: +S1/S2; RRR; no M/R/G; PMI non-displaced  Gastrointestinal: abdomen soft, NT/ND; no rebound or guarding; +BSx4  Back: spine midline, no bony tenderness or step-offs; no CVAT B/L  Extremities: WWP, no clubbing or cyanosis; no peripheral edema  Musculoskeletal: NROM x4; no joint swelling, tenderness or erythema  Vascular: 2+ radial, femoral, DP/PT pulses B/L  Dermatologic: skin warm, dry and intact; no rashes, wounds, or scars  Lymphatic: no submandibular or cervical LAD  Neurologic: AAOx3; CNII-XII grossly intact; no focal deficits  Psychiatric: affect and characteristics of appearance, verbalizations, behaviors are appropriate VITAL SIGNS:  T(C): 36.8 (08-31-18 @ 18:44), Max: 36.8 (08-31-18 @ 18:44)  T(F): 98.2 (08-31-18 @ 18:44), Max: 98.2 (08-31-18 @ 18:44)  HR: 88 (08-31-18 @ 20:17) (80 - 88)  BP: 168/72 (08-31-18 @ 20:17) (148/81 - 170/66)  BP(mean): --  RR: 18 (08-31-18 @ 20:17) (18 - 18)  SpO2: 95% (08-31-18 @ 20:17) (95% - 96%)  Wt(kg): --    PHYSICAL EXAM:  Constitutional: WDWN resting comfortably in bed; NAD  Head: NC/AT  Eyes: PERRL, EOMI, anicteric sclera  ENT: no nasal discharge; uvula midline, no oropharyngeal erythema or exudates; MMM  Neck: supple; no JVD or thyromegaly  Respiratory: CTA B/L; no W/R/R, no retractions  Cardiac: +S1/S2; RRR; no M/R/G; PMI non-displaced  Gastrointestinal: abdomen soft, NT/ND; no rebound or guarding; +BSx4  Back: spine midline, no bony tenderness or step-offs; no CVAT B/L  Extremities: WWP, no clubbing or cyanosis; no peripheral edema  Musculoskeletal: NROM x4; no joint swelling, tenderness or erythema  Vascular: 2+ radial, femoral, DP/PT pulses B/L  Dermatologic: skin warm, dry and intact; no rashes, wounds, or scars  Lymphatic: no submandibular or cervical LAD  Neurologic: AAOx3; CNII-XII grossly intact; no focal deficits  Psychiatric: affect and characteristics of appearance, verbalizations, behaviors are appropriate VITAL SIGNS:  T(C): 36.8 (08-31-18 @ 18:44), Max: 36.8 (08-31-18 @ 18:44)  T(F): 98.2 (08-31-18 @ 18:44), Max: 98.2 (08-31-18 @ 18:44)  HR: 88 (08-31-18 @ 20:17) (80 - 88)  BP: 168/72 (08-31-18 @ 20:17) (148/81 - 170/66)  BP(mean): --  RR: 18 (08-31-18 @ 20:17) (18 - 18)  SpO2: 95% (08-31-18 @ 20:17) (95% - 96%)  Wt(kg): --    PHYSICAL EXAM:  Constitutional: Thin elderly female, NAD  Head: NC/AT  Eyes: PERRL, EOMI, anicteric sclera  ENT: no nasal discharge; uvula midline, no oropharyngeal erythema or exudates; dry MM  Neck: supple; no JVD or thyromegaly  Respiratory: scattered rhonchi throughout all lung fields   Cardiac: +S1/S2; RRR   Gastrointestinal: abdomen soft, NT/ND; no rebound or guarding; +BSx4  Back: spine midline, no bony tenderness or step-offs; no CVAT B/L  Extremities: WWP, no clubbing or cyanosis; no peripheral edema  Musculoskeletal: 4/5 strength in upper and lower extremity, no joint swelling, tenderness or erythema  Vascular: 2+ radial, DP/PT pulses B/L  Dermatologic: skin warm, dry and intact; no rashes, wounds, or scars  Lymphatic: no submandibular or cervical LAD  Neurologic: AAOx3; decreased hearing, no focal deficits  Psychiatric: affect and characteristics of appearance, verbalizations, behaviors are appropriate VITAL SIGNS:  T(C): 36.8 (08-31-18 @ 18:44), Max: 36.8 (08-31-18 @ 18:44)  T(F): 98.2 (08-31-18 @ 18:44), Max: 98.2 (08-31-18 @ 18:44)  HR: 88 (08-31-18 @ 20:17) (80 - 88)  BP: 168/72 (08-31-18 @ 20:17) (148/81 - 170/66)  BP(mean): --  RR: 18 (08-31-18 @ 20:17) (18 - 18)  SpO2: 95% (08-31-18 @ 20:17) (95% - 96%)  Wt(kg): --    PHYSICAL EXAM:  Constitutional: Thin elderly female, NAD  Head: NC/AT  Eyes: PERRL, EOMI, anicteric sclera  ENT: no nasal discharge; uvula midline, no oropharyngeal erythema or exudates; dry MM  Neck: supple; no JVD or thyromegaly  Respiratory: scattered rhonchi throughout all lung fields   Cardiac: +S1/S2; RRR   Gastrointestinal: abdomen soft, NT/ND; no rebound or guarding; +BSx4  Back: spine midline, no bony tenderness or step-offs; no CVAT B/L  Extremities: WWP, no clubbing or cyanosis; no peripheral edema  Musculoskeletal: 3/5 strength in upper extremities, 4/5 strength lower extremities, no joint swelling, tenderness or erythema  Vascular: 2+ radial, DP/PT pulses B/L  Dermatologic: skin warm, dry and intact; no rashes, wounds, or scars  Lymphatic: no submandibular or cervical LAD  Neurologic: AAOx2-3; decreased hearing, sensation intact, no focal deficits  Psychiatric: affect and characteristics of appearance, verbalizations, behaviors are appropriate

## 2018-08-31 NOTE — H&P ADULT - PROBLEM SELECTOR PLAN 2
Patient presenting with dry cough for 3 days, found to be + parainfluenza virus. Scattered rhonchi on lung exam, no active wheezing.   - symptomatic management with cough suppressant and Duoneb Patient presenting with dry cough for 3 days, found to be + parainfluenza virus. Scattered rhonchi on lung exam, no active wheezing.   - symptomatic management with cough suppressant and Duoneb  -

## 2018-08-31 NOTE — DISCHARGE NOTE ADULT - MEDICATION SUMMARY - MEDICATIONS TO TAKE
I will START or STAY ON the medications listed below when I get home from the hospital:    losartan 50 mg oral tablet  -- 1 tab(s) by mouth once a day MDD:1 tab daily  -- Indication: For Essential hypertension    ProAir HFA 90 mcg/inh inhalation aerosol  -- 2 puff(s) inhaled 4 times a day, As Needed  -- Indication: For Asthma    Procardia XL 30 mg oral tablet, extended release  -- 1 tab(s) by mouth once a day  -- Indication: For HTN (hypertension)

## 2018-08-31 NOTE — DISCHARGE NOTE ADULT - CARE PLAN
Principal Discharge DX:	Parainfluenza infection  Goal:	Supportive care during infection  Assessment and plan of treatment:	You were admitted with weakness, frequent falls, and a cough. You were found to have parainfluenza, this is a virus which affects the upper airways. This virus can cause you to have increased cough and generally feel weaker. There is no antibiotic or antiviral that treats this infection, and care is supportive, including fluids and monitoring. We gave IV fluids during your stay and monitored for difficulty breathing, we also gave a medication to decrease your cough symptoms.  Secondary Diagnosis:	Hyponatremia  Assessment and plan of treatment:	You came in with a low sodium which was likely due to dehydration secondary to poor oral intake. We provided sodium in the fluids that were given and your sodium improved, this should help to improve the generalized weakness.  Secondary Diagnosis:	Frequent falls  Assessment and plan of treatment:	You came from home with frequent falls. Some of this may be related to weakness resulting from your infection and dehydration. We had physical therapy evaluate you during your stay and they recommended subacute rehab.  Secondary Diagnosis:	Essential hypertension  Assessment and plan of treatment:	You have a history of high resting blood pressure but also a history of a blood pressure that drops quickly when going from sitting to standing. We gradually re-introduced your old blood pressure medications while treating your dehydration.  Secondary Diagnosis:	Nutrition, metabolism, and development symptoms  Assessment and plan of treatment:	We added supplements for missing electrolytes during your stay and kept you on a heart-healthy diet.  Secondary Diagnosis:	Orthostatic hypotension  Assessment and plan of treatment:	You were Principal Discharge DX:	Parainfluenza infection  Goal:	Supportive care during infection  Assessment and plan of treatment:	You were admitted with weakness, frequent falls, and a cough. You were found to have parainfluenza, this is a virus which affects the upper airways. This virus can cause you to have increased cough and generally feel weaker. There is no antibiotic or antiviral that treats this infection, and care is supportive, including fluids and monitoring. We gave IV fluids during your stay and monitored for difficulty breathing, we also gave a medication to decrease your cough symptoms.  Secondary Diagnosis:	Hyponatremia  Assessment and plan of treatment:	You came in with a low sodium which was likely due to dehydration secondary to poor oral intake. We provided sodium in the fluids that were given and your sodium improved, this should help to improve the generalized weakness.  Secondary Diagnosis:	Frequent falls  Assessment and plan of treatment:	You came from home with frequent falls. Some of this may be related to weakness resulting from your infection and dehydration. We had physical therapy evaluate you during your stay and they recommended subacute rehab.  Secondary Diagnosis:	Essential hypertension  Assessment and plan of treatment:	You have a history of high resting blood pressure but also a history of a blood pressure that drops quickly when going from sitting to standing. We gradually re-introduced your old blood pressure medications while treating your dehydration.  Secondary Diagnosis:	Nutrition, metabolism, and development symptoms  Assessment and plan of treatment:	We added supplements for missing electrolytes during your stay and kept you on a heart-healthy diet.  Secondary Diagnosis:	Orthostatic hypotension  Assessment and plan of treatment:	You were found to have a condition called orthostatic hypotension, which is a blood pressure that drops quickly when going from sitting to standing. We gradually re-introduced your old blood pressure medications while treating your dehydration.

## 2018-08-31 NOTE — DISCHARGE NOTE ADULT - HOSPITAL COURSE
89yo F Jehovah witness with PMH HTN, Asthma, CVA (>10 years ago, no residual deficits), palpitations presenting from home with falls and weakness. Admitted to the regional medical floors for further management, found to have parainfluenza and hyponatremia. She was also found to have orthostatic hypotension which was believed to be due to hypovolemia. She initially dropped 40 points systolic upon standing with associated dizziness, this improved to a decrease of 20. PT evaluated and recommended KEIKO, this is the same recommendation made last time patient was in the hospital, she refused last time and went back home.

## 2018-08-31 NOTE — H&P ADULT - ATTENDING COMMENTS
Pt seen and examined agree with resident a/p with following addendum.    89 yo F with severe dehydration and hyponatremia     # Dehydration  - orthostatic as well  - likely from poor po intake, vomiting, and mutliple vasodilators/diuretics  - hold home BP meds  - s/p 1L NS, still very dry on exam, cont IV NS @100cc/hr    Hyponatremia  - likely from hypovolemia and thiazide diuretic  - cont IVF  - dc thiazide  - check BMP in AM  - f/u serum/urine osm studies     Hypokalemia  - likely from thiazide  - replete K > 4    Frequent falls  - likely from dehydration/poor po intake/vomiting  - fluid resuscitation, reassess with PT when euvolemic, not orthostatic

## 2018-09-01 LAB
ALBUMIN SERPL ELPH-MCNC: 3 G/DL — LOW (ref 3.3–5)
ALP SERPL-CCNC: 42 U/L — SIGNIFICANT CHANGE UP (ref 40–120)
ALT FLD-CCNC: 17 U/L — SIGNIFICANT CHANGE UP (ref 10–45)
ANION GAP SERPL CALC-SCNC: 14 MMOL/L — SIGNIFICANT CHANGE UP (ref 5–17)
APPEARANCE UR: CLEAR — SIGNIFICANT CHANGE UP
AST SERPL-CCNC: 39 U/L — SIGNIFICANT CHANGE UP (ref 10–40)
BILIRUB SERPL-MCNC: 0.3 MG/DL — SIGNIFICANT CHANGE UP (ref 0.2–1.2)
BILIRUB UR-MCNC: NEGATIVE — SIGNIFICANT CHANGE UP
BUN SERPL-MCNC: 13 MG/DL — SIGNIFICANT CHANGE UP (ref 7–23)
CALCIUM SERPL-MCNC: 8.5 MG/DL — SIGNIFICANT CHANGE UP (ref 8.4–10.5)
CHLORIDE SERPL-SCNC: 96 MMOL/L — SIGNIFICANT CHANGE UP (ref 96–108)
CO2 SERPL-SCNC: 24 MMOL/L — SIGNIFICANT CHANGE UP (ref 22–31)
COLOR SPEC: YELLOW — SIGNIFICANT CHANGE UP
CREAT SERPL-MCNC: 0.78 MG/DL — SIGNIFICANT CHANGE UP (ref 0.5–1.3)
DIFF PNL FLD: NEGATIVE — SIGNIFICANT CHANGE UP
GLUCOSE SERPL-MCNC: 90 MG/DL — SIGNIFICANT CHANGE UP (ref 70–99)
GLUCOSE UR QL: NEGATIVE — SIGNIFICANT CHANGE UP
HCT VFR BLD CALC: 36.3 % — SIGNIFICANT CHANGE UP (ref 34.5–45)
HGB BLD-MCNC: 11.9 G/DL — SIGNIFICANT CHANGE UP (ref 11.5–15.5)
KETONES UR-MCNC: NEGATIVE — SIGNIFICANT CHANGE UP
LEUKOCYTE ESTERASE UR-ACNC: NEGATIVE — SIGNIFICANT CHANGE UP
MAGNESIUM SERPL-MCNC: 1.9 MG/DL — SIGNIFICANT CHANGE UP (ref 1.6–2.6)
MCHC RBC-ENTMCNC: 27.6 PG — SIGNIFICANT CHANGE UP (ref 27–34)
MCHC RBC-ENTMCNC: 32.8 G/DL — SIGNIFICANT CHANGE UP (ref 32–36)
MCV RBC AUTO: 84.2 FL — SIGNIFICANT CHANGE UP (ref 80–100)
NITRITE UR-MCNC: NEGATIVE — SIGNIFICANT CHANGE UP
OSMOLALITY SERPL: 277 MOSM/KG — LOW (ref 280–301)
OSMOLALITY UR: 263 MOSMOL/KG — SIGNIFICANT CHANGE UP (ref 100–650)
PH UR: 7 — SIGNIFICANT CHANGE UP (ref 5–8)
PLATELET # BLD AUTO: 216 K/UL — SIGNIFICANT CHANGE UP (ref 150–400)
POTASSIUM SERPL-MCNC: 3.6 MMOL/L — SIGNIFICANT CHANGE UP (ref 3.5–5.3)
POTASSIUM SERPL-SCNC: 3.6 MMOL/L — SIGNIFICANT CHANGE UP (ref 3.5–5.3)
PROT SERPL-MCNC: 7 G/DL — SIGNIFICANT CHANGE UP (ref 6–8.3)
PROT UR-MCNC: NEGATIVE MG/DL — SIGNIFICANT CHANGE UP
RBC # BLD: 4.31 M/UL — SIGNIFICANT CHANGE UP (ref 3.8–5.2)
RBC # FLD: 13.8 % — SIGNIFICANT CHANGE UP (ref 10.3–16.9)
SODIUM SERPL-SCNC: 134 MMOL/L — LOW (ref 135–145)
SODIUM UR-SCNC: 47 MMOL/L — SIGNIFICANT CHANGE UP
SP GR SPEC: <=1.005 — SIGNIFICANT CHANGE UP (ref 1–1.03)
TSH SERPL-MCNC: 1.7 UIU/ML — SIGNIFICANT CHANGE UP (ref 0.35–4.94)
UROBILINOGEN FLD QL: 1 E.U./DL — SIGNIFICANT CHANGE UP
WBC # BLD: 4.4 K/UL — SIGNIFICANT CHANGE UP (ref 3.8–10.5)
WBC # FLD AUTO: 4.4 K/UL — SIGNIFICANT CHANGE UP (ref 3.8–10.5)

## 2018-09-01 PROCEDURE — 99233 SBSQ HOSP IP/OBS HIGH 50: CPT | Mod: GC

## 2018-09-01 RX ORDER — SODIUM CHLORIDE 9 MG/ML
1000 INJECTION INTRAMUSCULAR; INTRAVENOUS; SUBCUTANEOUS
Qty: 0 | Refills: 0 | Status: DISCONTINUED | OUTPATIENT
Start: 2018-09-01 | End: 2018-09-02

## 2018-09-01 RX ORDER — POTASSIUM CHLORIDE 20 MEQ
40 PACKET (EA) ORAL ONCE
Qty: 0 | Refills: 0 | Status: COMPLETED | OUTPATIENT
Start: 2018-09-01 | End: 2018-09-01

## 2018-09-01 RX ADMIN — Medication 3 MILLILITER(S): at 12:55

## 2018-09-01 RX ADMIN — Medication 100 MILLIGRAM(S): at 20:52

## 2018-09-01 RX ADMIN — Medication 3 MILLILITER(S): at 23:19

## 2018-09-01 RX ADMIN — SODIUM CHLORIDE 100 MILLILITER(S): 9 INJECTION INTRAMUSCULAR; INTRAVENOUS; SUBCUTANEOUS at 06:26

## 2018-09-01 RX ADMIN — SODIUM CHLORIDE 100 MILLILITER(S): 9 INJECTION INTRAMUSCULAR; INTRAVENOUS; SUBCUTANEOUS at 12:57

## 2018-09-01 RX ADMIN — HEPARIN SODIUM 5000 UNIT(S): 5000 INJECTION INTRAVENOUS; SUBCUTANEOUS at 12:56

## 2018-09-01 RX ADMIN — HEPARIN SODIUM 5000 UNIT(S): 5000 INJECTION INTRAVENOUS; SUBCUTANEOUS at 23:19

## 2018-09-01 RX ADMIN — Medication 3 MILLILITER(S): at 18:53

## 2018-09-01 RX ADMIN — Medication 40 MILLIEQUIVALENT(S): at 12:55

## 2018-09-01 RX ADMIN — Medication 3 MILLILITER(S): at 06:26

## 2018-09-01 NOTE — DIETITIAN INITIAL EVALUATION ADULT. - PROBLEM SELECTOR PLAN 5
Patient with a hx of HTN on 3 medication at home. Losartan 50, HCTZ 12.5 and nifedipine 30XL. Found to be HTN in the ED.   - pending orthostatics, if negative will restart losartan 50 and nifedipine 30  - hold diuretic in the setting of dehydration

## 2018-09-01 NOTE — DIETITIAN INITIAL EVALUATION ADULT. - ENERGY NEEDS
Ht 154.94cm; Wt 42.7Kg  IBW 47.7Kg; %IBW 90%  BMI 17.8    Utilized ABW to calculate needs, pt falls within % of IBW. Adjusted for age/PU/repletion.

## 2018-09-01 NOTE — PHYSICAL THERAPY INITIAL EVALUATION ADULT - DIAGNOSIS, PT EVAL
5A: Primary Prevention/Risk Reduction for Loss of Balance and Falling 6B: Impaired Aerobic Capacity/Endurance Associated with Deconditioning

## 2018-09-01 NOTE — DIETITIAN INITIAL EVALUATION ADULT. - PROBLEM SELECTOR PLAN 2
Patient presenting with dry cough for 3 days, found to be + parainfluenza virus. Scattered rhonchi on lung exam, no active wheezing.   - symptomatic management with cough suppressant and Duoneb  -

## 2018-09-01 NOTE — PROGRESS NOTE ADULT - PROBLEM SELECTOR PLAN 7
F: normal saline @ 100cc/hr, monitor fluid status and reassess need, at present patient does not appear overloaded   E: replete PRN   N: DASH diet

## 2018-09-01 NOTE — PROGRESS NOTE ADULT - SUBJECTIVE AND OBJECTIVE BOX
SUBJECTIVE / INTERVAL HPI: Patient seen and examined at bedside. Patient still has a dry cough, is asking for cough suppressants. She has been afebrile and denies any shortness of breath, wheezing, or chest pain. Tolerating PO but decreased appetite. Generally weak.     VITAL SIGNS:  Vital Signs Last 24 Hrs  T(C): 36.8 (01 Sep 2018 20:47), Max: 36.8 (01 Sep 2018 16:04)  T(F): 98.3 (01 Sep 2018 20:47), Max: 98.3 (01 Sep 2018 20:47)  HR: 90 (01 Sep 2018 20:47) (75 - 103)  BP: 168/79 (01 Sep 2018 20:47) (113/63 - 168/79)  BP(mean): --  RR: 28 (01 Sep 2018 20:47) (16 - 28)  SpO2: 96% (01 Sep 2018 20:47) (96% - 97%)    REVIEW OF SYSTEMS:    CONSTITUTIONAL: Weakness. No fevers or chills.   EYES/ENT: No visual changes;  No vertigo or throat pain   NECK: No pain or stiffness  RESPIRATORY: Dry cough. No wheezing, hemoptysis; No shortness of breath  CARDIOVASCULAR: No chest pain or palpitations  GASTROINTESTINAL: No abdominal or epigastric pain. No nausea, vomiting, or hematemesis; No diarrhea or constipation. No melena or hematochezia.  GENITOURINARY: No dysuria, frequency or hematuria  NEUROLOGICAL: No numbness or weakness  SKIN: No itching, burning, rashes, or lesions   All other review of systems is negative unless indicated above.    PHYSICAL EXAM:  General: WD, thin appearing female, appears weak.  HEENT: NC/AT; PERRL, clear conjunctiva  Neck: supple  Cardiovascular: +S1/S2; RRR  Respiratory: CTA b/l; no W/R/R, poor inspiratory effort.   Gastrointestinal: soft, NT/ND; +BSx4  Extremities: WWP; 2+ peripheral pulses; no edema   Neurological: AAOx3; no focal deficits    MEDICATIONS:  MEDICATIONS  (STANDING):  ALBUTerol/ipratropium for Nebulization 3 milliLiter(s) Nebulizer every 6 hours  heparin  Injectable 5000 Unit(s) SubCutaneous every 12 hours  sodium chloride 0.9%. 1000 milliLiter(s) (100 mL/Hr) IV Continuous <Continuous>    MEDICATIONS  (PRN):  guaiFENesin    Syrup 100 milliGRAM(s) Oral every 6 hours PRN Cough      ALLERGIES:  Allergies    No Known Allergies    Intolerances        LABS:                        11.9   4.4   )-----------( 216      ( 01 Sep 2018 09:58 )             36.3     09-01    134<L>  |  96  |  13  ----------------------------<  90  3.6   |  24  |  0.78    Ca    8.5      01 Sep 2018 09:58  Mg     1.9     09-01    TPro  7.0  /  Alb  3.0<L>  /  TBili  0.3  /  DBili  x   /  AST  39  /  ALT  17  /  AlkPhos  42  09-01      Urinalysis Basic - ( 01 Sep 2018 00:39 )    Color: Yellow / Appearance: Clear / SG: <=1.005 / pH: x  Gluc: x / Ketone: NEGATIVE  / Bili: Negative / Urobili: 1.0 E.U./dL   Blood: x / Protein: NEGATIVE mg/dL / Nitrite: NEGATIVE   Leuk Esterase: NEGATIVE / RBC: x / WBC x   Sq Epi: x / Non Sq Epi: x / Bacteria: x      CAPILLARY BLOOD GLUCOSE          RADIOLOGY & ADDITIONAL TESTS: Reviewed.

## 2018-09-01 NOTE — PHYSICAL THERAPY INITIAL EVALUATION ADULT - CRITERIA FOR SKILLED THERAPEUTIC INTERVENTIONS
rehab potential/functional limitations in following categories/anticipated discharge recommendation/impairments found/therapy frequency

## 2018-09-01 NOTE — PROGRESS NOTE ADULT - PROBLEM SELECTOR PLAN 1
Multiple falls over the past 3 days in setting of vomiting, decreased PO intake and diuretic use. Patient complains or dizziness and weakness. Found to be + parainfluenza virus, hyponatremic 126, hypo K 3.3 and hypoCl 86. Falls likely 2/2 weakness in the setting of infection, poor PO intake and vomiting. Patient denies CP or palpitation prior to fall making cardiogenic etiology less likely, negative troponin on admission. Denies LOC. CT Head with no evidence of acute intracranial hemorrhage.  - AM TSH, folate, vitamin B12   - f/u Orthostatics, still orthostatic today, dropped from 144 systolic to 122 systolic, improvement since yesterday  - Hold hydrochlorothiazide in the setting of dehydration Multiple falls over the past 3 days in setting of vomiting, decreased PO intake and diuretic use. Patient complains or dizziness and weakness. Found to be + parainfluenza virus, hyponatremic 126, hypo K 3.3 and hypoCl 86. Falls likely 2/2 weakness in the setting of infection, poor PO intake and vomiting. Patient denies CP or palpitation prior to fall making cardiogenic etiology less likely, negative troponin on admission. Denies LOC. CT Head with no evidence of acute intracranial hemorrhage.  - f/u Orthostatics, still orthostatic today, dropped from 144 systolic to 122 systolic, improvement since yesterday  - Hold hydrochlorothiazide in the setting of dehydration

## 2018-09-01 NOTE — PHYSICAL THERAPY INITIAL EVALUATION ADULT - ADDITIONAL COMMENTS
As per chairt, at last admission was living with daughter, but was demanding to move back to her own home, so has been living at home alone now with family checking in on her near daily. As per patient she lives in walk up building and her daughter will be staying with her. Patient uses a cane and has no other DME (?) states she has RW (?). As per chart, at last admission was living with daughter, but was demanding to move back to her own home, so has been living at home alone now with family checking in on her near daily. As per patient she lives in walk up building and her daughter will be staying with her (?). Patient uses a cane and states she has RW.

## 2018-09-01 NOTE — PROGRESS NOTE ADULT - PROBLEM SELECTOR PLAN 3
On admission found to be hypoNa 126, likely hypovolemic hyponatremia, resolving with fluids, 134 today.  - normal saline @ 100cc/hr, continue to reassess volume status.

## 2018-09-01 NOTE — DIETITIAN INITIAL EVALUATION ADULT. - PHYSICAL APPEARANCE
underweight/appears thin with noticeable loss of LBM; suspect moderate-serve PCM 2/2 suspected wt loss (pt could not quantify), poor po, and pt is also underweight.

## 2018-09-01 NOTE — DIETITIAN INITIAL EVALUATION ADULT. - OTHER INFO
89y/o F admitted with increased weakness and multiple recent falls. She is seen in bed working with PT. Pt reports a poor appetite and intake; consumed <50% of breakfast today. Denies N/V/D/C, pain, and mechanical issues. PTA pt reports normally eating well and is unsure of any wt changes. She was unable to report on UBW. Pt appears thin with noticeable loss of LBM; please see malnutrition alert. Encouraged adequate intake and ONS. Team can also consider liberalizing diet to regular 2/2 advanced age. Will follow.

## 2018-09-01 NOTE — PROGRESS NOTE ADULT - ATTENDING COMMENTS
89 yo F with severe dehydration, hypoNa, parainfluenza URI with cough     VS reviewed  Exam: getting nebs  S1S2  diffuse crackles while coughing  no edema    1) Dehydration  IVF    2) Hyponatremia  - likely from hypovolemia and thiazide diuretic  - cont IVF  - improving.  serial BMP check until completely normalizes    3) URI:  nebs, O2, cough tx      4) Frequent falls  - PT when clinically amenable

## 2018-09-01 NOTE — PHYSICAL THERAPY INITIAL EVALUATION ADULT - PERTINENT HX OF CURRENT PROBLEM, REHAB EVAL
89 yo F with severe dehydration and hyponatremia. In the past week patient with 3 falls while using assistive device cane. She states that she had leg weakness and dizziness and then fell. She denies tripping on anything, denies LOC. Did hit her head on Tuesday with healing eye laceration after falling in the kitchen. Morning of admission fell again out of bed.

## 2018-09-01 NOTE — PHYSICAL THERAPY INITIAL EVALUATION ADULT - GENERAL OBSERVATIONS, REHAB EVAL
Patient received semi-supine in bed in no acute distress, +IV, bed alarm. Patient with complaints of cough and feeling weak.

## 2018-09-01 NOTE — CHART NOTE - NSCHARTNOTEFT_GEN_A_CORE
Upon Nutritional Assessment by the Registered Dietitian your patient was determined to meet criteria / has evidence of the following diagnosis/diagnoses:          [ ]  Mild Protein Calorie Malnutrition        [X]  Moderate Protein Calorie Malnutrition        [ ] Severe Protein Calorie Malnutrition        [ ] Unspecified Protein Calorie Malnutrition        [X] Underweight / BMI <19        [ ] Morbid Obesity / BMI > 40      Findings as based on:  •  Comprehensive nutrition assessment and consultation  •  Calorie counts (nutrient intake analysis)  •  Food acceptance and intake status from observations by staff  •  Follow up  •  Patient education  •  Intervention secondary to interdisciplinary rounds  •   concerns      Treatment:    Refer to RD initial assessment       PROVIDER Section:     By signing this assessment you are acknowledging and agree with the diagnosis/diagnoses assigned by the Registered Dietitian    Comments:

## 2018-09-02 LAB
ANION GAP SERPL CALC-SCNC: 10 MMOL/L — SIGNIFICANT CHANGE UP (ref 5–17)
BUN SERPL-MCNC: 9 MG/DL — SIGNIFICANT CHANGE UP (ref 7–23)
CALCIUM SERPL-MCNC: 8.6 MG/DL — SIGNIFICANT CHANGE UP (ref 8.4–10.5)
CHLORIDE SERPL-SCNC: 102 MMOL/L — SIGNIFICANT CHANGE UP (ref 96–108)
CO2 SERPL-SCNC: 23 MMOL/L — SIGNIFICANT CHANGE UP (ref 22–31)
CREAT SERPL-MCNC: 0.69 MG/DL — SIGNIFICANT CHANGE UP (ref 0.5–1.3)
FOLATE SERPL-MCNC: >20 NG/ML — SIGNIFICANT CHANGE UP
GLUCOSE SERPL-MCNC: 94 MG/DL — SIGNIFICANT CHANGE UP (ref 70–99)
HCT VFR BLD CALC: 31.9 % — LOW (ref 34.5–45)
HGB BLD-MCNC: 10.6 G/DL — LOW (ref 11.5–15.5)
MCHC RBC-ENTMCNC: 27.7 PG — SIGNIFICANT CHANGE UP (ref 27–34)
MCHC RBC-ENTMCNC: 33.2 G/DL — SIGNIFICANT CHANGE UP (ref 32–36)
MCV RBC AUTO: 83.5 FL — SIGNIFICANT CHANGE UP (ref 80–100)
PLATELET # BLD AUTO: 234 K/UL — SIGNIFICANT CHANGE UP (ref 150–400)
POTASSIUM SERPL-MCNC: 3.6 MMOL/L — SIGNIFICANT CHANGE UP (ref 3.5–5.3)
POTASSIUM SERPL-SCNC: 3.6 MMOL/L — SIGNIFICANT CHANGE UP (ref 3.5–5.3)
RBC # BLD: 3.82 M/UL — SIGNIFICANT CHANGE UP (ref 3.8–5.2)
RBC # FLD: 13.7 % — SIGNIFICANT CHANGE UP (ref 10.3–16.9)
SODIUM SERPL-SCNC: 135 MMOL/L — SIGNIFICANT CHANGE UP (ref 135–145)
VIT B12 SERPL-MCNC: 1391 PG/ML — HIGH (ref 232–1245)
WBC # BLD: 5.1 K/UL — SIGNIFICANT CHANGE UP (ref 3.8–10.5)
WBC # FLD AUTO: 5.1 K/UL — SIGNIFICANT CHANGE UP (ref 3.8–10.5)

## 2018-09-02 PROCEDURE — 99233 SBSQ HOSP IP/OBS HIGH 50: CPT

## 2018-09-02 RX ORDER — LOSARTAN POTASSIUM 100 MG/1
50 TABLET, FILM COATED ORAL DAILY
Qty: 0 | Refills: 0 | Status: DISCONTINUED | OUTPATIENT
Start: 2018-09-02 | End: 2018-09-07

## 2018-09-02 RX ORDER — POTASSIUM CHLORIDE 20 MEQ
40 PACKET (EA) ORAL ONCE
Qty: 0 | Refills: 0 | Status: COMPLETED | OUTPATIENT
Start: 2018-09-02 | End: 2018-09-02

## 2018-09-02 RX ADMIN — Medication 40 MILLIEQUIVALENT(S): at 09:44

## 2018-09-02 RX ADMIN — HEPARIN SODIUM 5000 UNIT(S): 5000 INJECTION INTRAVENOUS; SUBCUTANEOUS at 12:00

## 2018-09-02 RX ADMIN — Medication 3 MILLILITER(S): at 23:35

## 2018-09-02 RX ADMIN — Medication 3 MILLILITER(S): at 06:41

## 2018-09-02 RX ADMIN — LOSARTAN POTASSIUM 50 MILLIGRAM(S): 100 TABLET, FILM COATED ORAL at 17:34

## 2018-09-02 RX ADMIN — Medication 3 MILLILITER(S): at 12:00

## 2018-09-02 RX ADMIN — SODIUM CHLORIDE 100 MILLILITER(S): 9 INJECTION INTRAMUSCULAR; INTRAVENOUS; SUBCUTANEOUS at 00:16

## 2018-09-02 RX ADMIN — Medication 3 MILLILITER(S): at 18:00

## 2018-09-02 RX ADMIN — HEPARIN SODIUM 5000 UNIT(S): 5000 INJECTION INTRAVENOUS; SUBCUTANEOUS at 23:35

## 2018-09-02 NOTE — PROGRESS NOTE ADULT - SUBJECTIVE AND OBJECTIVE BOX
89 yo F with severe dehydration, hypoNa, parainfluenza URI with cough     VS reviewed  -180 --> 140-150 after resuming 1 home med  Exam:   NAD, much more energetic, eating with good appetite  mmm  S1S2  diffuse crackles MUCH improved since yesterdya  soft abd  no edema    labs reviewed

## 2018-09-03 LAB
-  CEFTRIAXONE: SIGNIFICANT CHANGE UP
-  CLINDAMYCIN: SIGNIFICANT CHANGE UP
-  ERYTHROMYCIN: SIGNIFICANT CHANGE UP
-  PENICILLIN: SIGNIFICANT CHANGE UP
-  VANCOMYCIN: SIGNIFICANT CHANGE UP
ANION GAP SERPL CALC-SCNC: 11 MMOL/L — SIGNIFICANT CHANGE UP (ref 5–17)
BUN SERPL-MCNC: 9 MG/DL — SIGNIFICANT CHANGE UP (ref 7–23)
CALCIUM SERPL-MCNC: 9 MG/DL — SIGNIFICANT CHANGE UP (ref 8.4–10.5)
CHLORIDE SERPL-SCNC: 98 MMOL/L — SIGNIFICANT CHANGE UP (ref 96–108)
CO2 SERPL-SCNC: 23 MMOL/L — SIGNIFICANT CHANGE UP (ref 22–31)
CREAT SERPL-MCNC: 0.79 MG/DL — SIGNIFICANT CHANGE UP (ref 0.5–1.3)
CULTURE RESULTS: SIGNIFICANT CHANGE UP
GLUCOSE SERPL-MCNC: 71 MG/DL — SIGNIFICANT CHANGE UP (ref 70–99)
HCT VFR BLD CALC: 35.4 % — SIGNIFICANT CHANGE UP (ref 34.5–45)
HGB BLD-MCNC: 11.8 G/DL — SIGNIFICANT CHANGE UP (ref 11.5–15.5)
MAGNESIUM SERPL-MCNC: 1.4 MG/DL — LOW (ref 1.6–2.6)
MCHC RBC-ENTMCNC: 27.8 PG — SIGNIFICANT CHANGE UP (ref 27–34)
MCHC RBC-ENTMCNC: 33.3 G/DL — SIGNIFICANT CHANGE UP (ref 32–36)
MCV RBC AUTO: 83.3 FL — SIGNIFICANT CHANGE UP (ref 80–100)
METHOD TYPE: SIGNIFICANT CHANGE UP
METHOD TYPE: SIGNIFICANT CHANGE UP
ORGANISM # SPEC MICROSCOPIC CNT: SIGNIFICANT CHANGE UP
PLATELET # BLD AUTO: 188 K/UL — SIGNIFICANT CHANGE UP (ref 150–400)
POTASSIUM SERPL-MCNC: 4.2 MMOL/L — SIGNIFICANT CHANGE UP (ref 3.5–5.3)
POTASSIUM SERPL-SCNC: 4.2 MMOL/L — SIGNIFICANT CHANGE UP (ref 3.5–5.3)
RBC # BLD: 4.25 M/UL — SIGNIFICANT CHANGE UP (ref 3.8–5.2)
RBC # FLD: 13.8 % — SIGNIFICANT CHANGE UP (ref 10.3–16.9)
SODIUM SERPL-SCNC: 132 MMOL/L — LOW (ref 135–145)
SPECIMEN SOURCE: SIGNIFICANT CHANGE UP
WBC # BLD: 5.5 K/UL — SIGNIFICANT CHANGE UP (ref 3.8–10.5)
WBC # FLD AUTO: 5.5 K/UL — SIGNIFICANT CHANGE UP (ref 3.8–10.5)

## 2018-09-03 PROCEDURE — 99233 SBSQ HOSP IP/OBS HIGH 50: CPT

## 2018-09-03 RX ORDER — SODIUM CHLORIDE 9 MG/ML
250 INJECTION INTRAMUSCULAR; INTRAVENOUS; SUBCUTANEOUS ONCE
Qty: 0 | Refills: 0 | Status: COMPLETED | OUTPATIENT
Start: 2018-09-03 | End: 2018-09-03

## 2018-09-03 RX ORDER — MAGNESIUM SULFATE 500 MG/ML
2 VIAL (ML) INJECTION ONCE
Qty: 0 | Refills: 0 | Status: COMPLETED | OUTPATIENT
Start: 2018-09-03 | End: 2018-09-03

## 2018-09-03 RX ADMIN — Medication 3 MILLILITER(S): at 07:21

## 2018-09-03 RX ADMIN — Medication 3 MILLILITER(S): at 23:37

## 2018-09-03 RX ADMIN — HEPARIN SODIUM 5000 UNIT(S): 5000 INJECTION INTRAVENOUS; SUBCUTANEOUS at 23:37

## 2018-09-03 RX ADMIN — Medication 3 MILLILITER(S): at 18:11

## 2018-09-03 RX ADMIN — Medication 3 MILLILITER(S): at 12:09

## 2018-09-03 RX ADMIN — Medication 50 GRAM(S): at 12:08

## 2018-09-03 RX ADMIN — SODIUM CHLORIDE 1000 MILLILITER(S): 9 INJECTION INTRAMUSCULAR; INTRAVENOUS; SUBCUTANEOUS at 03:39

## 2018-09-03 RX ADMIN — LOSARTAN POTASSIUM 50 MILLIGRAM(S): 100 TABLET, FILM COATED ORAL at 07:21

## 2018-09-03 RX ADMIN — HEPARIN SODIUM 5000 UNIT(S): 5000 INJECTION INTRAVENOUS; SUBCUTANEOUS at 12:08

## 2018-09-03 NOTE — PROGRESS NOTE ADULT - PROBLEM SELECTOR PLAN 3
On admission found to be hypoNa 126, likely hypovolemic hyponatremia, resolving with fluids, 132 today.  - normal saline @ 100cc/hr dc'd, continue to follow sodium, may restart if drops again.

## 2018-09-03 NOTE — PROGRESS NOTE ADULT - SUBJECTIVE AND OBJECTIVE BOX
SUBJECTIVE / INTERVAL HPI: Patient seen and examined at bedside.     VITAL SIGNS:  Vital Signs Last 24 Hrs  T(C): 36.3 (03 Sep 2018 09:23), Max: 36.8 (02 Sep 2018 22:09)  T(F): 97.3 (03 Sep 2018 09:23), Max: 98.3 (02 Sep 2018 22:09)  HR: 94 (03 Sep 2018 09:23) (79 - 94)  BP: 144/75 (03 Sep 2018 09:23) (144/75 - 187/83)  BP(mean): --  RR: 18 (03 Sep 2018 09:23) (15 - 20)  SpO2: 98% (03 Sep 2018 09:23) (98% - 100%)    REVIEW OF SYSTEMS:    CONSTITUTIONAL: No weakness, fevers or chills.   EYES/ENT: No visual changes;  No vertigo or throat pain   NECK: No pain or stiffness  RESPIRATORY: No cough, wheezing, hemoptysis; No shortness of breath  CARDIOVASCULAR: No chest pain or palpitations  GASTROINTESTINAL: No abdominal or epigastric pain. No nausea, vomiting, or hematemesis; No diarrhea or constipation. No melena or hematochezia.  GENITOURINARY: No dysuria, frequency or hematuria  NEUROLOGICAL: No numbness or weakness  SKIN: No itching, burning, rashes, or lesions   All other review of systems is negative unless indicated above.    PHYSICAL EXAM:  General: WDWN  HEENT: NC/AT; PERRL, clear conjunctiva  Neck: supple  Cardiovascular: +S1/S2; RRR  Respiratory: CTA b/l; no W/R/R  Gastrointestinal: soft, NT/ND; +BSx4  Extremities: WWP; 2+ peripheral pulses; no edema   Neurological: AAOx3; no focal deficits    MEDICATIONS:  MEDICATIONS  (STANDING):  ALBUTerol/ipratropium for Nebulization 3 milliLiter(s) Nebulizer every 6 hours  heparin  Injectable 5000 Unit(s) SubCutaneous every 12 hours  losartan 50 milliGRAM(s) Oral daily    MEDICATIONS  (PRN):  guaiFENesin    Syrup 100 milliGRAM(s) Oral every 6 hours PRN Cough      ALLERGIES:  Allergies    No Known Allergies    Intolerances        LABS:                        11.8   5.5   )-----------( 188      ( 03 Sep 2018 07:22 )             35.4     09-03    132<L>  |  98  |  9   ----------------------------<  71  4.2   |  23  |  0.79    Ca    9.0      03 Sep 2018 07:22  Mg     1.4     09-03          CAPILLARY BLOOD GLUCOSE          RADIOLOGY & ADDITIONAL TESTS: Reviewed.

## 2018-09-03 NOTE — PROGRESS NOTE ADULT - PROBLEM SELECTOR PLAN 1
Multiple falls over the past 3 days in setting of vomiting, decreased PO intake and diuretic use. Patient complains or dizziness and weakness. Found to be + parainfluenza virus, hyponatremic 126, hypo K 3.3 and hypoCl 86. Falls likely 2/2 weakness in the setting of infection, poor PO intake and vomiting. Patient denies CP or palpitation prior to fall making cardiogenic etiology less likely, negative troponin on admission. Denies LOC. CT Head with no evidence of acute intracranial hemorrhage.  - f/u Orthostatics, still orthostatic today, dropped from 185 systolic to 141 systolic going from lying down to standing. Patient has been adequately hydrated, this may be baseline for patient.   - Hold hydrochlorothiazide in the setting of dehydration

## 2018-09-04 LAB
ANION GAP SERPL CALC-SCNC: 10 MMOL/L — SIGNIFICANT CHANGE UP (ref 5–17)
BUN SERPL-MCNC: 9 MG/DL — SIGNIFICANT CHANGE UP (ref 7–23)
CALCIUM SERPL-MCNC: 9.3 MG/DL — SIGNIFICANT CHANGE UP (ref 8.4–10.5)
CHLORIDE SERPL-SCNC: 99 MMOL/L — SIGNIFICANT CHANGE UP (ref 96–108)
CO2 SERPL-SCNC: 26 MMOL/L — SIGNIFICANT CHANGE UP (ref 22–31)
CREAT SERPL-MCNC: 0.91 MG/DL — SIGNIFICANT CHANGE UP (ref 0.5–1.3)
GLUCOSE SERPL-MCNC: 86 MG/DL — SIGNIFICANT CHANGE UP (ref 70–99)
HCT VFR BLD CALC: 35.4 % — SIGNIFICANT CHANGE UP (ref 34.5–45)
HGB BLD-MCNC: 11.7 G/DL — SIGNIFICANT CHANGE UP (ref 11.5–15.5)
MAGNESIUM SERPL-MCNC: 1.7 MG/DL — SIGNIFICANT CHANGE UP (ref 1.6–2.6)
MCHC RBC-ENTMCNC: 27.4 PG — SIGNIFICANT CHANGE UP (ref 27–34)
MCHC RBC-ENTMCNC: 33.1 G/DL — SIGNIFICANT CHANGE UP (ref 32–36)
MCV RBC AUTO: 82.9 FL — SIGNIFICANT CHANGE UP (ref 80–100)
PHOSPHATE SERPL-MCNC: 3.3 MG/DL — SIGNIFICANT CHANGE UP (ref 2.5–4.5)
PLATELET # BLD AUTO: 287 K/UL — SIGNIFICANT CHANGE UP (ref 150–400)
POTASSIUM SERPL-MCNC: 4.3 MMOL/L — SIGNIFICANT CHANGE UP (ref 3.5–5.3)
POTASSIUM SERPL-SCNC: 4.3 MMOL/L — SIGNIFICANT CHANGE UP (ref 3.5–5.3)
RBC # BLD: 4.27 M/UL — SIGNIFICANT CHANGE UP (ref 3.8–5.2)
RBC # FLD: 13.8 % — SIGNIFICANT CHANGE UP (ref 10.3–16.9)
SODIUM SERPL-SCNC: 135 MMOL/L — SIGNIFICANT CHANGE UP (ref 135–145)
WBC # BLD: 5.2 K/UL — SIGNIFICANT CHANGE UP (ref 3.8–10.5)
WBC # FLD AUTO: 5.2 K/UL — SIGNIFICANT CHANGE UP (ref 3.8–10.5)

## 2018-09-04 PROCEDURE — 99233 SBSQ HOSP IP/OBS HIGH 50: CPT

## 2018-09-04 RX ORDER — MAGNESIUM SULFATE 500 MG/ML
1 VIAL (ML) INJECTION ONCE
Qty: 0 | Refills: 0 | Status: COMPLETED | OUTPATIENT
Start: 2018-09-04 | End: 2018-09-04

## 2018-09-04 RX ORDER — NIFEDIPINE 30 MG
30 TABLET, EXTENDED RELEASE 24 HR ORAL DAILY
Qty: 0 | Refills: 0 | Status: DISCONTINUED | OUTPATIENT
Start: 2018-09-05 | End: 2018-09-07

## 2018-09-04 RX ORDER — NIFEDIPINE 30 MG
30 TABLET, EXTENDED RELEASE 24 HR ORAL ONCE
Qty: 0 | Refills: 0 | Status: COMPLETED | OUTPATIENT
Start: 2018-09-04 | End: 2018-09-04

## 2018-09-04 RX ADMIN — Medication 3 MILLILITER(S): at 05:56

## 2018-09-04 RX ADMIN — Medication 100 GRAM(S): at 17:39

## 2018-09-04 RX ADMIN — HEPARIN SODIUM 5000 UNIT(S): 5000 INJECTION INTRAVENOUS; SUBCUTANEOUS at 23:53

## 2018-09-04 RX ADMIN — HEPARIN SODIUM 5000 UNIT(S): 5000 INJECTION INTRAVENOUS; SUBCUTANEOUS at 11:32

## 2018-09-04 RX ADMIN — LOSARTAN POTASSIUM 50 MILLIGRAM(S): 100 TABLET, FILM COATED ORAL at 05:56

## 2018-09-04 RX ADMIN — Medication 30 MILLIGRAM(S): at 10:24

## 2018-09-04 RX ADMIN — Medication 3 MILLILITER(S): at 11:33

## 2018-09-04 RX ADMIN — Medication 3 MILLILITER(S): at 23:53

## 2018-09-04 RX ADMIN — Medication 100 MILLIGRAM(S): at 23:53

## 2018-09-04 RX ADMIN — Medication 3 MILLILITER(S): at 17:40

## 2018-09-04 NOTE — PROGRESS NOTE ADULT - SUBJECTIVE AND OBJECTIVE BOX
Seen by me this afternoon/evening.  States that the SOB and cough has improved. Tolerating PO. No diarrhea.  Rest of ROS is negative.    BP still a bit elevated but better than yesterday, other VSS  NAD  Oral mucosa semi-dry  Chest: Decreased BS B/L  Heart: S1S2 ok  Abd: soft/NT/ND  Extrem: no edema    LABS REVIEWED    1. Dehydration, resolved    2. Hyponatremia, resolved    3. URI/parainfluenza, c/w symptomatic treatment    4. HTN: Resumed losartan and nifedipine, continue to hold HCTZ for now.    5. Frequent falls, c/w PT --> KEIKO, pt in agreement with plan for KEIKO.  Palliative consult is pending. Seen by me this afternoon/evening.  States that the SOB and cough has improved. Tolerating PO. No diarrhea.  Rest of ROS is negative.    BP still a bit elevated but better than yesterday, other VSS  NAD  Oral mucosa semi-dry  Chest: Decreased BS B/L  Heart: S1S2 ok  Abd: soft/NT/ND  Extrem: no edema    LABS REVIEWED    A/Plan    1. Dehydration, resolved    2. Hyponatremia, resolved    3. URI/parainfluenza, c/w symptomatic treatment    4. HTN: Resumed losartan and nifedipine, continue to hold HCTZ for now.    5. Frequent falls, c/w PT --> KEIKO, pt in agreement with plan for KEIKO.  Palliative consult is pending.

## 2018-09-05 DIAGNOSIS — Z78.9 OTHER SPECIFIED HEALTH STATUS: ICD-10-CM

## 2018-09-05 DIAGNOSIS — Z51.5 ENCOUNTER FOR PALLIATIVE CARE: ICD-10-CM

## 2018-09-05 PROCEDURE — 99223 1ST HOSP IP/OBS HIGH 75: CPT

## 2018-09-05 PROCEDURE — 99233 SBSQ HOSP IP/OBS HIGH 50: CPT | Mod: GC

## 2018-09-05 RX ORDER — MAGNESIUM SULFATE 500 MG/ML
1 VIAL (ML) INJECTION ONCE
Qty: 0 | Refills: 0 | Status: COMPLETED | OUTPATIENT
Start: 2018-09-05 | End: 2018-09-05

## 2018-09-05 RX ADMIN — Medication 100 MILLIGRAM(S): at 18:14

## 2018-09-05 RX ADMIN — Medication 3 MILLILITER(S): at 18:14

## 2018-09-05 RX ADMIN — LOSARTAN POTASSIUM 50 MILLIGRAM(S): 100 TABLET, FILM COATED ORAL at 06:27

## 2018-09-05 RX ADMIN — Medication 100 GRAM(S): at 09:23

## 2018-09-05 RX ADMIN — HEPARIN SODIUM 5000 UNIT(S): 5000 INJECTION INTRAVENOUS; SUBCUTANEOUS at 12:43

## 2018-09-05 RX ADMIN — Medication 3 MILLILITER(S): at 06:27

## 2018-09-05 RX ADMIN — HEPARIN SODIUM 5000 UNIT(S): 5000 INJECTION INTRAVENOUS; SUBCUTANEOUS at 23:12

## 2018-09-05 RX ADMIN — Medication 3 MILLILITER(S): at 23:12

## 2018-09-05 RX ADMIN — Medication 100 MILLIGRAM(S): at 23:12

## 2018-09-05 RX ADMIN — Medication 100 MILLIGRAM(S): at 12:43

## 2018-09-05 RX ADMIN — Medication 3 MILLILITER(S): at 12:43

## 2018-09-05 RX ADMIN — Medication 30 MILLIGRAM(S): at 06:27

## 2018-09-05 NOTE — CONSULT NOTE ADULT - ASSESSMENT
89yo F Jehovah witness with PMH HTN, Asthma, CVA (>10 years ago, no residual deficits), palpitations presenting from home with falls and weakness. Admitted to the regional medical floors for further management.   Pt ruled in for parainfluenza.  She is being treated supportively for same.  She is having medication adjustment re: her antihypertensives.  Palliaitve Medicine asked to assist with overall planning for goals of care in the setting of increasing frailty for this formerly independent 89 yo female who is starting to have a functional decline.

## 2018-09-05 NOTE — PROGRESS NOTE ADULT - PROBLEM SELECTOR PLAN 7
F: normal saline @ 100cc/hr dc'd as patient no longer appears dehydrated  E: replete PRN   N: DASH diet F: none  E: replete PRN K <4, Mg < 2  N: DASH diet

## 2018-09-05 NOTE — PROGRESS NOTE ADULT - ATTENDING COMMENTS
Awaiting insurance authorization for KEIKO.  C/w symptomatic treatment of parainfluenza.  Apprec Palliative assistance. Awaiting insurance authorization for KEIKO.  C/w symptomatic treatment of parainfluenza.  Apprec Palliative assistance.  HTN under control with current meds, continue to hold HCTZ even upon discharge.

## 2018-09-05 NOTE — CONSULT NOTE ADULT - ASSESSMENT
89yo F Jehovah witnesswith PMH HTN, Asthma, CVA (>10 years ago, no residual deficits), palpitations presenting from home with falls and weakness. Admitted to the regional medical floors for further management, found to have parainfluenza and hyponatremia.    Problem/Plan - 1:  ·  Problem: Frequent falls.  Plan: Multiple falls over the past 3 days in setting of vomiting, decreased PO intake and diuretic use. Patient complains or dizziness and weakness. Found to be + parainfluenza virus, hyponatremic 126, hypo K 3.3 and hypoCl 86. Falls likely 2/2 weakness in the setting of infection, poor PO intake and vomiting. Patient denies CP or palpitation prior to fall making cardiogenic etiology less likely, negative troponin on admission. Denies LOC. CT Head with no evidence of acute intracranial hemorrhage.  -continue to monitor for signs of ongoing dizziness  - Hold hydrochlorothiazide in the setting of dehydration.    Problem/Plan - 2:  ·  Problem: Parainfluenza infection.  Plan: Patient presenting with dry cough for 3 days, found to be + parainfluenza virus. Scattered rhonchi on lung exam, no active wheezing. Improved today.   - symptomatic management with cough suppressant and Duoneb.     Problem/Plan - 3:  ·  Problem: Hyponatremia.  Plan: On admission found to be hypoNa 126, likely hypovolemic hyponatremia, resolving with fluids, 132 today.  - normal saline @ 100cc/hr dc'd, continue to follow sodium, may restart if drops again.

## 2018-09-05 NOTE — PROGRESS NOTE ADULT - SUBJECTIVE AND OBJECTIVE BOX
O/N Events:  Subjective/ROS: Denies HA, CP, SOB, n/v, changes in bowel/urinary habits.  12pt ROS otherwise negative.    VITALS  Vital Signs Last 24 Hrs  T(C): 37 (05 Sep 2018 05:06), Max: 37 (05 Sep 2018 05:06)  T(F): 98.6 (05 Sep 2018 05:06), Max: 98.6 (05 Sep 2018 05:06)  HR: 86 (05 Sep 2018 05:06) (86 - 97)  BP: 151/78 (05 Sep 2018 05:06) (144/69 - 151/78)  BP(mean): --  RR: 18 (05 Sep 2018 05:06) (18 - 18)  SpO2: 98% (05 Sep 2018 05:06) (97% - 100%)    CAPILLARY BLOOD GLUCOSE          PHYSICAL EXAM  General: A&Ox3; NAD  Head: NC/AT; MMM; PERRL; EOMI;  Neck: Supple; no JVD  Respiratory: CTA B/L; no wheezes/crackles   Cardiovascular: Regular rhythm/rate; S1/S2   Gastrointestinal: Soft; NTND; normoactive BS  Extremities: WWP; no edema/cyanosis  Neurological:  CNII-XII grossly intact; no obvious focal deficits    MEDICATIONS  (STANDING):  ALBUTerol/ipratropium for Nebulization 3 milliLiter(s) Nebulizer every 6 hours  heparin  Injectable 5000 Unit(s) SubCutaneous every 12 hours  losartan 50 milliGRAM(s) Oral daily  NIFEdipine XL 30 milliGRAM(s) Oral daily    MEDICATIONS  (PRN):  guaiFENesin    Syrup 100 milliGRAM(s) Oral every 6 hours PRN Cough      No Known Allergies      LABS                        11.7   5.2   )-----------( 287      ( 04 Sep 2018 06:39 )             35.4     09-04    135  |  99  |  9   ----------------------------<  86  4.3   |  26  |  0.91    Ca    9.3      04 Sep 2018 06:39  Phos  3.3     09-04  Mg     1.7     09-04                IMAGING/EKG/ETC  EKG:  Xray:  CT:  MRI: O/N Events: NITESH  Subjective/ROS: Denies HA, CP, SOB, n/v, changes in bowel/urinary habits.  12pt ROS otherwise negative.    VITALS  Vital Signs Last 24 Hrs  T(C): 37 (05 Sep 2018 05:06), Max: 37 (05 Sep 2018 05:06)  T(F): 98.6 (05 Sep 2018 05:06), Max: 98.6 (05 Sep 2018 05:06)  HR: 86 (05 Sep 2018 05:06) (86 - 97)  BP: 151/78 (05 Sep 2018 05:06) (144/69 - 151/78)  BP(mean): --  RR: 18 (05 Sep 2018 05:06) (18 - 18)  SpO2: 98% (05 Sep 2018 05:06) (97% - 100%)    CAPILLARY BLOOD GLUCOSE          PHYSICAL EXAM  General: NAD  Head: NC/AT; MMM; PERRL; EOMI;  Neck: Supple; no JVD  Respiratory: CTA B/L; mild crackles   Cardiovascular: Regular rhythm/rate; S1/S2   Gastrointestinal: Soft; NTND; normoactive BS  Extremities: WWP; no edema/cyanosis  Neurological:  CNII-XII grossly intact; no obvious focal deficits    MEDICATIONS  (STANDING):  ALBUTerol/ipratropium for Nebulization 3 milliLiter(s) Nebulizer every 6 hours  heparin  Injectable 5000 Unit(s) SubCutaneous every 12 hours  losartan 50 milliGRAM(s) Oral daily  NIFEdipine XL 30 milliGRAM(s) Oral daily    MEDICATIONS  (PRN):  guaiFENesin    Syrup 100 milliGRAM(s) Oral every 6 hours PRN Cough      No Known Allergies      LABS                        11.7   5.2   )-----------( 287      ( 04 Sep 2018 06:39 )             35.4     09-04    135  |  99  |  9   ----------------------------<  86  4.3   |  26  |  0.91    Ca    9.3      04 Sep 2018 06:39  Phos  3.3     09-04  Mg     1.7     09-04                IMAGING/EKG/ETC  EKG:  Xray:  CT:  MRI: O/N Events: NITESH  Subjective/ROS: Denies HA, CP, SOB, n/v, changes in bowel/urinary habits.  12pt ROS otherwise negative.    VITALS  Vital Signs Last 24 Hrs  T(C): 37 (05 Sep 2018 05:06), Max: 37 (05 Sep 2018 05:06)  T(F): 98.6 (05 Sep 2018 05:06), Max: 98.6 (05 Sep 2018 05:06)  HR: 86 (05 Sep 2018 05:06) (86 - 97)  BP: 151/78 (05 Sep 2018 05:06) (144/69 - 151/78)  BP(mean): --  RR: 18 (05 Sep 2018 05:06) (18 - 18)  SpO2: 98% (05 Sep 2018 05:06) (97% - 100%)    CAPILLARY BLOOD GLUCOSE          PHYSICAL EXAM  General: NAD  Head: NC/AT; MMM; PERRL; EOMI;  Neck: Supple; no JVD  Respiratory: CTA B/L; mild crackles   Cardiovascular: Regular rhythm/rate; S1/S2   Gastrointestinal: Soft; NTND; normoactive BS  Extremities: WWP; no edema/cyanosis  Neurological:  CNII-XII grossly intact; no obvious focal deficits    MEDICATIONS  (STANDING):  ALBUTerol/ipratropium for Nebulization 3 milliLiter(s) Nebulizer every 6 hours  heparin  Injectable 5000 Unit(s) SubCutaneous every 12 hours  losartan 50 milliGRAM(s) Oral daily  NIFEdipine XL 30 milliGRAM(s) Oral daily    MEDICATIONS  (PRN):  guaiFENesin    Syrup 100 milliGRAM(s) Oral every 6 hours PRN Cough      No Known Allergies      LABS                        11.7   5.2   )-----------( 287      ( 04 Sep 2018 06:39 )             35.4     09-04    135  |  99  |  9   ----------------------------<  86  4.3   |  26  |  0.91    Ca    9.3      04 Sep 2018 06:39  Phos  3.3     09-04  Mg     1.7     09-04                IMAGING/EKG/ETC: reviewed

## 2018-09-05 NOTE — CONSULT NOTE ADULT - SUBJECTIVE AND OBJECTIVE BOX
CANDY CHO   MRN-[id number]     ([id number]):     HPI:  89yo F Jehovah witness with PMH HTN, Asthma, CVA (>10 years ago, no residual deficits), palpitations presenting from home with falls and weakness. Daughters at bedside with patient. In the past week patient with 3 falls while using assistive device cane. She states that she had leg weakness and dizziness and then fell. She denies tripping on anything, denies LOC. Did hit her head on Tuesday with healing eye laceration after falling in the kitchen. This morning fell again out of bed. At last admission was living with daughter, but was demanding to move back to her own home, so has been living at home alone now with family checking in on her near daily. Patient endorse a dry non-productive cough since Tuesday but no fevers or chills. On Tuesday patient with several episodes of yellow non-bloody vomiting, which resolved.  Family endorses poor PO intake. Patient complaining of decreased vision in the left eye associated with L eye pain, was scheduled to see an eye doctor but unable to make the appointment.     REVIEW OF SYSTEMS: see below    CONSTITUTIONAL: +weakness, dizziness, weight loss (unable to quantify), No fevers or chills  EYES/ENT: + visual changes in the L eye;  No vertigo or throat pain   NECK: No pain or stiffness  RESPIRATORY: + dry cough, shortness of breath,  No wheezing, hemoptysis  CARDIOVASCULAR: No chest pain or palpitations  GASTROINTESTINAL: +nausea, vomiting,  No abdominal or epigastric pain. No hematemesis; No diarrhea or constipation. No melena or hematochezia.  GENITOURINARY: No dysuria, frequency or hematuria  NEUROLOGICAL: No numbness   SKIN: No itching, rashes    In the ED VS Tmax 98.2 HR 80-84, -148/66-81, RR 18, SpO2 96% RA  In the ED Labs significant for Na 126, Cl 86, RVP positive for parainfluenza.   In the ED patient received Klor 40mEq and normal saline bolus x1L (31 Aug 2018 21:26)      PAST MEDICAL & SURGICAL HISTORY:  Palpitations  Asthma  HTN (hypertension)  CVA (cerebral vascular accident)  Cataract      FAMILY HISTORY:  Family history of heart attack (Mother)  Family history of prostate cancer in father (Father)    Reviewed and not pertinent    ROS:  no complaints but remains "dizzy"  Unable to attain due to:                      Dyspnea (Evonne 0-10):  0/10               N/V (Y/N):    no      Secretions (Y/N):  yes- + productive cough             Agitation(Y/N): no  Pain (Y/N):     no  -Provocation/Palliation:  -Quality/Quantity:  -Radiating:  -Severity:  -Timing/Frequency:  -Impact on ADLs:    General:  weak, falling  HEENT:   hard of hearing  Neck:  Denied  CVS:  Dizzy  Resp:  Denies sob  GI:  Denied  :  Denied  Musc:  Denied  Neuro:  Denied    Skin:  Denied      Allergies:  No Known Allergies    Intolerances        Opiate Naive (Y/N): yes   -iStop reviewed (Y/N):  yes ref # [id number]    Medications:      MEDICATIONS  (STANDING):  ALBUTerol/ipratropium for Nebulization 3 milliLiter(s) Nebulizer every 6 hours  guaiFENesin    Syrup 100 milliGRAM(s) Oral every 6 hours  heparin  Injectable 5000 Unit(s) SubCutaneous every 12 hours  losartan 50 milliGRAM(s) Oral daily  NIFEdipine XL 30 milliGRAM(s) Oral daily    MEDICATIONS  (PRN):      Labs:    CBC:                        11.7   5.2   )-----------( 287      ( 04 Sep 2018 06:39 )             35.4     CMP:        135  |  99  |  9   ----------------------------<  86  4.3   |  26  |  0.91    Ca    9.3      04 Sep 2018 06:39  Phos  3.3     -  Mg     1.7           Imaging:  Reviewed    < from: CT Head No Cont (18 @ 18:12) >    EXAM:  CT BRAIN                          PROCEDURE DATE:  2018      INTERPRETATION:  INDICATIONS: Frequent falls.    TECHNIQUE:  Serial axial images were obtained from the skull base to the   vertex without the use of intravenous contrast.    COMPARISON EXAMINATION: 2018.    FINDINGS:    VENTRICLES AND SULCI: Parenchymal volume loss is present which is   commensurate with patient age.   INTRA-AXIAL: No acute transcortical infarct, acute hemorrhage, or midline   shift is present. There is an area of encephalomalacia and gliosis in   left parieto-occipital lobe consistent with an area of chronic infarction   with corresponding ex vacuo dilatation of the left lateral ventricle.   There is diminished attenuation within the periventricular and   subcortical white matter consistent with chronic microangiopathic disease.  EXTRA-AXIAL: There is prominence of the CSF in the frontotemporal region   anteriorly, left more than right, which may represent chronic subdural   effusions with preferential atrophy. MR could be obtained for further   characterization on an outpatient basis if indicated clinically.  VISUALIZED SINUSES: No air-fluid levels are identified. There is mucosal   thickening ethmoid air cells, there is near complete opacification of the   right sphenoid sinus with thickened sclerotic wall suggesting component   of chronic sinus inflammatory disease. There is minor nasal septal   deviation leftward.  VISUALIZED MASTOIDS:  Clear.  CALVARIUM:  No fracture.  MISCELLANEOUS: Right native lens is absent.     IMPRESSION:    1. No CT evidence of acute intracranial hemorrhage.  2. 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      "Thank you for the opportunity to participate in the care of this   patient."    < end of copied text >        < from: Xray Chest 2 Views PA/Lat (18 @ 17:55) >  EXAM:  XR CHEST PA LAT 2V                          PROCEDURE DATE:  2018      INTERPRETATION:  Clinical History: Cough    Frontal and lateral examination the chest demonstrates the heart to be   within normal limits in transverse diameter. Hyperaeration lungs with   flattening of diaphragms. Chronic fibrotic changes No acute infiltrates.   Mild dextroscoliosis thoracic spine. Patient noted involving aortic knob.    Impression: No acute infiltrates. Chronic lung changes.     < end of copied text >      PEx:  T(C): 36.8 (18 @ 08:26), Max: 37 (18 @ 05:06)  HR: 83 (18 @ 08:26) (83 - 97)  BP: 140/73 (18 @ 08:26) (140/73 - 151/78)  RR: 16 (18 @ 08:26) (16 - 18)  SpO2: 97% (18 @ 08:26) (97% - 99%)  Wt(kg): 42.7kgs      General: ill appearing, thin, frail elderly female in no acute distress  HEENT:  nc/at, neg nodes, + dentures, moist oral cavity  Neck:  supple, neg nodes  CVS: regular  Resp: diminished breath sounds, + wheeze, + rhonchi, + productive cough  GI:  flat, soft, nontender, nondistended  :   voids  Musc:   weak, thin  Neuro: slightly confused  Psych:   awake, appropriate engaging  Skin: warm, dry, intact  Lymph:  neg  Preadmit Karnofsky:  40 %           Current Karnofsky:  30    %  Cachexia (Y/N):  yes  BMI:  17.8    Advanced Directives:     Full Code         Decision maker:  Pt may have limited medical decision making capacity  Legal surrogate:  pt has 5 children and pt indicates that she "trust" her lita Florez to make medical decisions for her.     Social History:  in the 1960s, raised 5 children by herself, worked in a laundry room, + [employer], lives by self in a 5 story walk up, no formal home care services but has frequent visits by family members who bring in meals etc  PT is acknowledging that she cannot keep up with her apartment anymore, is considering moving in with her lita Florez after [facility]    GOALS OF CARE DISCUSSION:       Palliative care info/counseling provided- intro to service provided	           Family meeting- n/a at this time       Advanced Directives addressed please see Advance Care Planning Note; pt reports that her lita Florez has "all my papers"	           See previous Palliative Medicine Note       Documentation of GOC: 	return to [facility] to improve functional status. Pt is considering moving in with her lita Florez          REFERRALS:	        Palliative Med        Unit SW/Case Mgmt       Patient/Family Support-seen by same       PT/OT

## 2018-09-05 NOTE — CONSULT NOTE ADULT - PROBLEM SELECTOR RECOMMENDATION 9
In the setting of acute viral infection and dehydration   Medication management per primary team  Pt and family in agreement with returning to [facility] to optimize pts functional status  Pt awaiting insurance auth for transfer to [facility]  Pt now agreeable to move into her dgt apt after rehab, which she has not been agreeable to on previous admissions

## 2018-09-05 NOTE — CONSULT NOTE ADULT - SUBJECTIVE AND OBJECTIVE BOX
Patient is a 89y old  Female who presents with a chief complaint of Weakness and falls (05 Sep 2018 06:20)       HPI:  89yo F Jehovah witness with PMH HTN, Asthma, CVA (>10 years ago, no residual deficits), palpitations presenting from home with falls and weakness. Daughters at bedside with patient. In the past week patient with 3 falls while using assistive device cane. She states that she had leg weakness and dizziness and then fell. She denies tripping on anything, denies LOC. Did hit her head on Tuesday with healing eye laceration after falling in the kitchen. This morning fell again out of bed. At last admission was living with daughter, but was demanding to move back to her own home, so has been living at home alone now with family checking in on her near daily. Patient endorse a dry non-productive cough since Tuesday but no fevers or chills. On Tuesday patient with several episodes of yellow non-bloody vomiting, which resolved.  Family endorses poor PO intake. Patient complaining of decreased vision in the left eye associated with L eye pain, was scheduled to see an eye doctor but unable to make the appointment.     REVIEW OF SYSTEMS:    CONSTITUTIONAL: +weakness, dizziness, weight loss (unable to quantify), No fevers or chills  EYES/ENT: + visual changes in the L eye;  No vertigo or throat pain   NECK: No pain or stiffness  RESPIRATORY: + dry cough, shortness of breath,  No wheezing, hemoptysis  CARDIOVASCULAR: No chest pain or palpitations  GASTROINTESTINAL: +nausea, vomiting,  No abdominal or epigastric pain. No hematemesis; No diarrhea or constipation. No melena or hematochezia.  GENITOURINARY: No dysuria, frequency or hematuria  NEUROLOGICAL: No numbness   SKIN: No itching, rashes    In the ED VS Tmax 98.2 HR 80-84, -148/66-81, RR 18, SpO2 96% RA  In the ED Labs significant for Na 126, Cl 86, RVP positive for parainfluenza.   In the ED patient received Klor 40mEq and normal saline bolus x1L (31 Aug 2018 21:26)      PAST MEDICAL & SURGICAL HISTORY:  Palpitations  Asthma  HTN (hypertension)  CVA (cerebral vascular accident)  Cataract      MEDICATIONS  (STANDING):  ALBUTerol/ipratropium for Nebulization 3 milliLiter(s) Nebulizer every 6 hours  heparin  Injectable 5000 Unit(s) SubCutaneous every 12 hours  losartan 50 milliGRAM(s) Oral daily  magnesium sulfate  IVPB 1 Gram(s) IV Intermittent once  NIFEdipine XL 30 milliGRAM(s) Oral daily    MEDICATIONS  (PRN):  guaiFENesin    Syrup 100 milliGRAM(s) Oral every 6 hours PRN Cough      Social History per patient: lives alone in a walkup apartment, has a daughter who visits daily, denied home care services    Functional Level Prior to Admission per patient: ADL independent, walks with a rolling walker    FAMILY HISTORY:  Family history of heart attack (Mother)  Family history of prostate cancer in father (Father)      CBC Full  -  ( 04 Sep 2018 06:39 )  WBC Count : 5.2 K/uL  Hemoglobin : 11.7 g/dL  Hematocrit : 35.4 %  Platelet Count - Automated : 287 K/uL  Mean Cell Volume : 82.9 fL  Mean Cell Hemoglobin : 27.4 pg  Mean Cell Hemoglobin Concentration : 33.1 g/dL  Auto Neutrophil # : x  Auto Lymphocyte # : x  Auto Monocyte # : x  Auto Eosinophil # : x  Auto Basophil # : x  Auto Neutrophil % : x  Auto Lymphocyte % : x  Auto Monocyte % : x  Auto Eosinophil % : x  Auto Basophil % : x      09-04    135  |  99  |  9   ----------------------------<  86  4.3   |  26  |  0.91    Ca    9.3      04 Sep 2018 06:39  Phos  3.3     09-04  Mg     1.7     09-04              Radiology:      < from: Xray Chest 2 Views PA/Lat (08.31.18 @ 17:55) >  EXAM:  XR CHEST PA LAT 2V                          PROCEDURE DATE:  08/31/2018          INTERPRETATION:  Clinical History: Cough    Frontal and lateral examination the chest demonstrates the heart to be   within normal limits in transverse diameter. Hyperaeration lungs with   flattening of diaphragms. Chronic fibrotic changes No acute infiltrates.   Mild dextroscoliosis thoracic spine. Patient noted involving aortic knob.    Impression: No acute infiltrates. Chronic lung changes.       < from: CT Head No Cont (08.31.18 @ 18:12) >  EXAM:  CT BRAIN                          PROCEDURE DATE:  08/31/2018          INTERPRETATION:  INDICATIONS: Frequent falls.    TECHNIQUE:  Serial axial images were obtained from the skull base to the   vertex without the use of intravenous contrast.    COMPARISON EXAMINATION: 6/2/2018.    FINDINGS:    VENTRICLES AND SULCI: Parenchymal volume loss is present which is   commensurate with patient age.   INTRA-AXIAL: No acute transcortical infarct, acute hemorrhage, or midline   shift is present. There is an area of encephalomalacia and gliosis in   left parieto-occipital lobe consistent with an area of chronic infarction   with corresponding ex vacuo dilatation of the left lateral ventricle.   There is diminished attenuation within the periventricular and   subcortical white matter consistent with chronic microangiopathic disease.  EXTRA-AXIAL: There is prominence of the CSF in the frontotemporal region   anteriorly, left more than right, which may represent chronic subdural   effusions with preferential atrophy. MR could be obtained for further   characterization on an outpatient basis if indicated clinically.  VISUALIZED SINUSES: No air-fluid levels are identified. There is mucosal   thickening ethmoid air cells, there is near complete opacification of the   right sphenoid sinus with thickened sclerotic wall suggesting component   of chronic sinus inflammatory disease. There is minor nasal septal   deviation leftward.  VISUALIZED MASTOIDS:  Clear.  CALVARIUM:  No fracture.  MISCELLANEOUS: Right native lens is absent.     IMPRESSION:    1. No CT evidence of acute intracranial hemorrhage.  2. 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                    Vital Signs Last 24 Hrs  T(C): 36.8 (05 Sep 2018 08:26), Max: 37 (05 Sep 2018 05:06)  T(F): 98.2 (05 Sep 2018 08:26), Max: 98.6 (05 Sep 2018 05:06)  HR: 83 (05 Sep 2018 08:26) (83 - 97)  BP: 140/73 (05 Sep 2018 08:26) (140/73 - 151/78)  BP(mean): --  RR: 16 (05 Sep 2018 08:26) (16 - 18)  SpO2: 97% (05 Sep 2018 08:26) (97% - 100%)    REVIEW OF SYSTEMS:    CONSTITUTIONAL:  fatigue  EYES: No eye pain, visual disturbances, or discharge  ENMT:  No difficulty hearing, tinnitus, vertigo; No sinus or throat pain  NECK: No pain or stiffness  BREASTS: No pain, masses, or nipple discharge  RESPIRATORY: No cough, wheezing, chills or hemoptysis; No shortness of breath  CARDIOVASCULAR: No chest pain, palpitations, dizziness, or leg swelling  GASTROINTESTINAL: No abdominal or epigastric pain. No nausea, vomiting, or hematemesis; No diarrhea or constipation. No melena or hematochezia.  GENITOURINARY: No dysuria, frequency, hematuria, or incontinence  NEUROLOGICAL: No headaches, memory loss, loss of strength, numbness, or tremors  SKIN: No itching, burning, rashes, or lesions   LYMPH NODES: No enlarged glands  ENDOCRINE: No heat or cold intolerance; No hair loss  MUSCULOSKELETAL: No joint pain or swelling; No muscle, back, or extremity pain  PSYCHIATRIC: No depression, anxiety, mood swings, or difficulty sleeping  HEME/LYMPH: No easy bruising, or bleeding gums  ALLERGY AND IMMUNOLOGIC: No hives or eczema  VASCULAR: no swelling, erythema      Physical Exam: on contact droplet isolation, frail 89 yo AA woman lying in semi Murrell's position, c/o feeling tired    Head: normocephalic, atraumatic    Eyes: PERRLA, EOMI, no nystagmus, sclera anicteric    ENT: nasal discharge, uvula midline, no oropharyngeal erythema/exudate    Neck: supple, negative JVD, negative carotid bruits, no thyromegaly    Chest: CTA bilaterally, neg wheeze, rhonchi, rales, crackles, egophany    Cardiovascular: regular rate and rhythm, neg murmurs/rubs/gallops    Abdomen: soft, non distended, non tender, negative rebound/guarding, normal bowel sounds, neg hepatosplenomegaly    Extremities: WWP, neg cyanosis/clubbing/edema, negative calf tenderness to palpation, negative Olivia's sign    :     Neurologic Exam:    Alert and oriented x 2 to person, place, 2015 date/year, speech fluent w/o dysarthria, repetition intact, comprehension intact,     Cranial Nerves:     II:                       pupils equal, round and reactive to light, visual fields intact   III/ IV/VI:            extraocular movements intact, neg nystagmus, ptosis  V:                       facial sensation intact, V1-3 normal  VII:                     face symmetric, no droop, normal eye closure and smile  VIII:                    hearing intact to finger rub bilaterally  IX/ X:                 soft palate rise symmetrical  XI:                      head turning, shoulder shrug normal  XII:                     tongue midline    Motor Exam:    Upper Extremities:              Right:   4/5                                                          negative pronator drift                                                Left:     4/5                                                          negative pronator drift      Lower Extremities:             Right:      4/5 hip flexors/adductors/abductors                                                           5/5 quadriceps/hamstrings                                                           5/5 dorsiflexors/plantar flexors/invertors-evertors                                               Left:       4/5 hip flexors/adductors/abductors                                                            5/5 quadriceps/hamstrings                                                            5/5 dorsiflexors/plantar flexors/invertors-evertors    Sensory:              intact to LT/PP in all UE/LE dermatomes    DTR:                   = biceps/     triceps/     brachioradialis                            = patella/   medial hamstring/    ankle                            neg clonus                            neg Babinski                            neg Hoffmans    Finger to Nose:  wnl    Heel to Shin:       wnl    Rapid Alternating movements:   wnl    Joint Position Sense:  intact    Romberg:  not tested    Tandem Walking:  not tested    Gait:  not tested        PM&R Impression:    1) deconditioned  2) no focal weakness  3) gait dysfunction  4) dehydration/ hyponatremia/ parainfluenza        Recommendations:    1) Physical therapy focusing on therapeutic exercises, bed mobility/transfer out of bed evaluation, progressive ambulation with assistive devices.    2) Anticipated Disposition Plan/Recommendations: subacute rehab placement

## 2018-09-05 NOTE — PROGRESS NOTE ADULT - PROBLEM SELECTOR PLAN 1
Multiple falls over the past 3 days in setting of vomiting, decreased PO intake and diuretic use. Patient complains or dizziness and weakness. Found to be + parainfluenza virus, hyponatremic 126, hypo K 3.3 and hypoCl 86. Falls likely 2/2 weakness in the setting of infection, poor PO intake and vomiting. Patient denies CP or palpitation prior to fall making cardiogenic etiology less likely, negative troponin on admission. Denies LOC. CT Head with no evidence of acute intracranial hemorrhage.  - f/u Orthostatics, still orthostatic today, dropped from 185 systolic to 141 systolic going from lying down to standing. Patient has been adequately hydrated, this may be baseline for patient.   - Hold hydrochlorothiazide in the setting of dehydration Multiple falls over the past 3 days in setting of vomiting, decreased PO intake and diuretic use. Patient complains or dizziness and weakness. Found to be + parainfluenza virus, hyponatremic 126, hypo K 3.3 and hypoCl 86. Falls likely 2/2 weakness in the setting of infection, poor PO intake and vomiting. Patient denies CP or palpitation prior to fall making cardiogenic etiology less likely, negative troponin on admission. Denies LOC. CT Head with no evidence of acute intracranial hemorrhage.  -continue to monitor for signs of ongoing dizziness  - Hold hydrochlorothiazide in the setting of dehydration Multiple falls over the past 3 days in setting of vomiting, decreased PO intake and diuretic use. Patient complains or dizziness and weakness. Found to be + parainfluenza virus, hyponatremic 126, hypo K 3.3 and hypoCl 86. Falls likely 2/2 weakness in the setting of infection, poor PO intake and vomiting. Patient denies CP or palpitation prior to fall making cardio etiology less likely, negative troponin on admission. Denies LOC. CT Head with no evidence of acute intracranial hemorrhage.  -continue to monitor for signs of ongoing dizziness  - Hold hydrochlorothiazide in the setting of dehydration

## 2018-09-05 NOTE — PROGRESS NOTE ADULT - PROBLEM SELECTOR PLAN 3
On admission found to be hypoNa 126, likely hypovolemic hyponatremia, resolving with fluids, 132 today.  - normal saline @ 100cc/hr dc'd, continue to follow sodium, may restart if drops again. On admission found to be hypoNa 126, likely hypovolemic hyponatremia. Resolving with fluids, 136 today.  -continue to monitor BMP

## 2018-09-05 NOTE — CONSULT NOTE ADULT - PROBLEM SELECTOR RECOMMENDATION 5
Support provided to patient and family. Patient to have access to supportive services during rest of hospital stay as the patient/family deemed necessary ie. Chaplaincy,  Patient and family supportive services,  etc.

## 2018-09-05 NOTE — PROGRESS NOTE ADULT - PROBLEM SELECTOR PLAN 5
Patient with a hx of HTN on 3 medication at home. Losartan 50, HCTZ 12.5 and nifedipine 30XL. Found to be HTN in the ED.   - pending orthostatics, if negative will restart losartan 50 and nifedipine 30  - hold diuretic in the setting of dehydration Patient with a hx of HTN on 3 medication at home. Losartan 50, HCTZ 12.5 and nifedipine 30XL. Found to be HTN in the ED.   - Restarted losartan 50 and nifedipine 30  -continue to monitor BP and orthostatics  - hold diuretic in the setting of dehydration

## 2018-09-05 NOTE — CHART NOTE - NSCHARTNOTEFT_GEN_A_CORE
Admitting Diagnosis:   Patient is a 89y old  Female who presents with a chief complaint of Weakness and falls (05 Sep 2018 14:12)      PAST MEDICAL & SURGICAL HISTORY:  Palpitations  Asthma  HTN (hypertension)  CVA (cerebral vascular accident)  Cataract      Current Nutrition Order: DASH/ TLC, Ensure Enlive TID (1050 kcal, 60g protein, 540mL free H2O)     PO Intake: Good (%) [   ]  Fair (50-75%) [   ] Poor (<25%) [ x  ]    GI Issues: no noted n/v/d/c    Pain: no pain noted    Skin Integrity: stage 2 pressure ulcer noted     Labs:   09-04    135  |  99  |  9   ----------------------------<  86  4.3   |  26  |  0.91    Ca    9.3      04 Sep 2018 06:39  Phos  3.3     09-04  Mg     1.7     09-04      CAPILLARY BLOOD GLUCOSE          Medications:  MEDICATIONS  (STANDING):  ALBUTerol/ipratropium for Nebulization 3 milliLiter(s) Nebulizer every 6 hours  guaiFENesin    Syrup 100 milliGRAM(s) Oral every 6 hours  heparin  Injectable 5000 Unit(s) SubCutaneous every 12 hours  losartan 50 milliGRAM(s) Oral daily  NIFEdipine XL 30 milliGRAM(s) Oral daily    MEDICATIONS  (PRN):      Weight: 42.7kg     Weight Change: no changes noted, please re-take     Estimated energy needs:   Ht 154.94cm; Wt 42.7Kg  IBW 47.7Kg; %IBW 90%  BMI 17.8    Utilized ABW to calculate needs, pt falls within % of IBW. Adjusted for age/PU/repletion.    1281-1494kcal (30-35kcal/kg)  51-59g pro (1.2-1.4g/kg pro)   1281-1494ml (30-35ml/kg)     Subjective:   89y/o F admitted with increased weakness and multiple recent falls. DC planning in place to [facility] pending insurance authorization + pt will live with daughter upon DC as she is now with advanced needs. Still reporting poor intake at this time, lethargic, ensure noted at bedside. Pal care continues to follow and assess GOC.       Previous Nutrition Diagnosis: Increased nutrient needs r/t increased kcal/pro needs AEB need for repletion/ PU    Active [x   ]  Resolved [   ]    If resolved, new PES:     Goal: meet >75% EER    Recommendations:  1. Liberalize diet   2. Encourage PO intake  3. Consume >75% PO     Education: encouraged intake    Risk Level: High [  x ] Moderate [   ] Low [   ]

## 2018-09-06 LAB
ANION GAP SERPL CALC-SCNC: 13 MMOL/L — SIGNIFICANT CHANGE UP (ref 5–17)
BUN SERPL-MCNC: 11 MG/DL — SIGNIFICANT CHANGE UP (ref 7–23)
CALCIUM SERPL-MCNC: 9.7 MG/DL — SIGNIFICANT CHANGE UP (ref 8.4–10.5)
CHLORIDE SERPL-SCNC: 98 MMOL/L — SIGNIFICANT CHANGE UP (ref 96–108)
CO2 SERPL-SCNC: 23 MMOL/L — SIGNIFICANT CHANGE UP (ref 22–31)
CREAT SERPL-MCNC: 0.81 MG/DL — SIGNIFICANT CHANGE UP (ref 0.5–1.3)
GLUCOSE SERPL-MCNC: 78 MG/DL — SIGNIFICANT CHANGE UP (ref 70–99)
HCT VFR BLD CALC: 36.1 % — SIGNIFICANT CHANGE UP (ref 34.5–45)
HGB BLD-MCNC: 12.2 G/DL — SIGNIFICANT CHANGE UP (ref 11.5–15.5)
MAGNESIUM SERPL-MCNC: 1.7 MG/DL — SIGNIFICANT CHANGE UP (ref 1.6–2.6)
MCHC RBC-ENTMCNC: 28 PG — SIGNIFICANT CHANGE UP (ref 27–34)
MCHC RBC-ENTMCNC: 33.8 G/DL — SIGNIFICANT CHANGE UP (ref 32–36)
MCV RBC AUTO: 83 FL — SIGNIFICANT CHANGE UP (ref 80–100)
PLATELET # BLD AUTO: 278 K/UL — SIGNIFICANT CHANGE UP (ref 150–400)
POTASSIUM SERPL-MCNC: 4.4 MMOL/L — SIGNIFICANT CHANGE UP (ref 3.5–5.3)
POTASSIUM SERPL-SCNC: 4.4 MMOL/L — SIGNIFICANT CHANGE UP (ref 3.5–5.3)
RBC # BLD: 4.35 M/UL — SIGNIFICANT CHANGE UP (ref 3.8–5.2)
RBC # FLD: 13.6 % — SIGNIFICANT CHANGE UP (ref 10.3–16.9)
SODIUM SERPL-SCNC: 134 MMOL/L — LOW (ref 135–145)
WBC # BLD: 5.7 K/UL — SIGNIFICANT CHANGE UP (ref 3.8–10.5)
WBC # FLD AUTO: 5.7 K/UL — SIGNIFICANT CHANGE UP (ref 3.8–10.5)

## 2018-09-06 RX ORDER — MAGNESIUM SULFATE 500 MG/ML
1 VIAL (ML) INJECTION ONCE
Qty: 0 | Refills: 0 | Status: COMPLETED | OUTPATIENT
Start: 2018-09-06 | End: 2018-09-06

## 2018-09-06 RX ADMIN — LOSARTAN POTASSIUM 50 MILLIGRAM(S): 100 TABLET, FILM COATED ORAL at 06:02

## 2018-09-06 RX ADMIN — Medication 100 MILLIGRAM(S): at 22:39

## 2018-09-06 RX ADMIN — Medication 3 MILLILITER(S): at 06:02

## 2018-09-06 RX ADMIN — Medication 3 MILLILITER(S): at 11:49

## 2018-09-06 RX ADMIN — Medication 3 MILLILITER(S): at 23:25

## 2018-09-06 RX ADMIN — Medication 100 GRAM(S): at 09:37

## 2018-09-06 RX ADMIN — Medication 3 MILLILITER(S): at 18:38

## 2018-09-06 RX ADMIN — HEPARIN SODIUM 5000 UNIT(S): 5000 INJECTION INTRAVENOUS; SUBCUTANEOUS at 11:49

## 2018-09-06 RX ADMIN — HEPARIN SODIUM 5000 UNIT(S): 5000 INJECTION INTRAVENOUS; SUBCUTANEOUS at 22:39

## 2018-09-06 RX ADMIN — Medication 100 MILLIGRAM(S): at 06:02

## 2018-09-06 RX ADMIN — Medication 30 MILLIGRAM(S): at 06:02

## 2018-09-06 NOTE — PROGRESS NOTE ADULT - SUBJECTIVE AND OBJECTIVE BOX
INTERVAL HPI/OVERNIGHT EVENTS:    SUBJECTIVE: Patient seen and examined at bedside.     CONSTITUTIONAL: No weakness, fevers or chills  EYES/ENT: No visual changes;  No vertigo or throat pain   NECK: No pain or stiffness  RESPIRATORY: No cough, wheezing, hemoptysis; No shortness of breath  CARDIOVASCULAR: No chest pain or palpitations  GASTROINTESTINAL: No abdominal or epigastric pain. No nausea, vomiting, or hematemesis; No diarrhea or constipation. No melena or hematochezia.  GENITOURINARY: No dysuria, frequency or hematuria  NEUROLOGICAL: No numbness or weakness  SKIN: No itching, rashes    OBJECTIVE:    VITAL SIGNS:  ICU Vital Signs Last 24 Hrs  T(C): 36.4 (06 Sep 2018 05:16), Max: 37 (05 Sep 2018 20:37)  T(F): 97.6 (06 Sep 2018 05:16), Max: 98.6 (05 Sep 2018 20:37)  HR: 81 (06 Sep 2018 05:16) (81 - 91)  BP: 140/72 (06 Sep 2018 05:16) (124/67 - 140/76)  BP(mean): --  ABP: --  ABP(mean): --  RR: 20 (06 Sep 2018 05:16) (16 - 20)  SpO2: 97% (06 Sep 2018 05:16) (97% - 98%)        CAPILLARY BLOOD GLUCOSE          PHYSICAL EXAM:    General: NAD  HEENT: NC/AT; PERRL, clear conjunctiva  Neck: supple  Respiratory: CTA b/l  Cardiovascular: +S1/S2; RRR  Abdomen: soft, NT/ND; +BS x4  Extremities: WWP, 2+ peripheral pulses b/l; no LE edema  Skin: normal color and turgor; no rash  Neurological:    MEDICATIONS:  MEDICATIONS  (STANDING):  ALBUTerol/ipratropium for Nebulization 3 milliLiter(s) Nebulizer every 6 hours  guaiFENesin    Syrup 100 milliGRAM(s) Oral every 6 hours  heparin  Injectable 5000 Unit(s) SubCutaneous every 12 hours  losartan 50 milliGRAM(s) Oral daily  NIFEdipine XL 30 milliGRAM(s) Oral daily    MEDICATIONS  (PRN):      ALLERGIES:  Allergies    No Known Allergies    Intolerances        LABS:                RADIOLOGY & ADDITIONAL TESTS: Reviewed. INTERVAL HPI/OVERNIGHT EVENTS: NITESH    SUBJECTIVE: Patient seen and examined at bedside.     OBJECTIVE:  VITAL SIGNS:  ICU Vital Signs Last 24 Hrs  T(C): 36.4 (06 Sep 2018 05:16), Max: 37 (05 Sep 2018 20:37)  T(F): 97.6 (06 Sep 2018 05:16), Max: 98.6 (05 Sep 2018 20:37)  HR: 81 (06 Sep 2018 05:16) (81 - 91)  BP: 140/72 (06 Sep 2018 05:16) (124/67 - 140/76)  BP(mean): --  ABP: --  ABP(mean): --  RR: 20 (06 Sep 2018 05:16) (16 - 20)  SpO2: 97% (06 Sep 2018 05:16) (97% - 98%)        CAPILLARY BLOOD GLUCOSE    PHYSICAL EXAM:    General: NAD  HEENT: NC/AT; PERRL, clear conjunctiva  Neck: supple  Respiratory: CTA b/l  Cardiovascular: +S1/S2; RRR  Abdomen: soft, NT/ND; +BS x4  Extremities: WWP, 2+ peripheral pulses b/l; no LE edema  Skin: normal color and turgor; no rash  Neurological:    MEDICATIONS:  MEDICATIONS  (STANDING):  ALBUTerol/ipratropium for Nebulization 3 milliLiter(s) Nebulizer every 6 hours  guaiFENesin    Syrup 100 milliGRAM(s) Oral every 6 hours  heparin  Injectable 5000 Unit(s) SubCutaneous every 12 hours  losartan 50 milliGRAM(s) Oral daily  NIFEdipine XL 30 milliGRAM(s) Oral daily    MEDICATIONS  (PRN):      ALLERGIES:  Allergies    No Known Allergies    Intolerances        LABS:                RADIOLOGY & ADDITIONAL TESTS: Reviewed.

## 2018-09-06 NOTE — PROGRESS NOTE ADULT - PROBLEM SELECTOR PLAN 7
F: none  E: replete PRN K <4, Mg < 2  N: DASH diet F: none  E: replete PRN K <4, Mg < 2  N: DASH diet  Dispo: awaiting bed placement at [facility]

## 2018-09-06 NOTE — PROGRESS NOTE ADULT - PROBLEM SELECTOR PLAN 3
On admission found to be hypoNa 126, likely hypovolemic hyponatremia. Resolving with fluids, 136 today.  -continue to monitor BMP

## 2018-09-06 NOTE — PROGRESS NOTE ADULT - SUBJECTIVE AND OBJECTIVE BOX
Physical Medicine and Rehabilitation Progress Note:    Patient is a 89y old  Female who presents with a chief complaint of Weakness and falls (06 Sep 2018 07:13)      HPI:  89yo F Jehovah witness with PMH HTN, Asthma, CVA (>10 years ago, no residual deficits), palpitations presenting from home with falls and weakness. Daughters at bedside with patient. In the past week patient with 3 falls while using assistive device cane. She states that she had leg weakness and dizziness and then fell. She denies tripping on anything, denies LOC. Did hit her head on Tuesday with healing eye laceration after falling in the kitchen. This morning fell again out of bed. At last admission was living with daughter, but was demanding to move back to her own home, so has been living at home alone now with family checking in on her near daily. Patient endorse a dry non-productive cough since Tuesday but no fevers or chills. On Tuesday patient with several episodes of yellow non-bloody vomiting, which resolved.  Family endorses poor PO intake. Patient complaining of decreased vision in the left eye associated with L eye pain, was scheduled to see an eye doctor but unable to make the appointment.     REVIEW OF SYSTEMS:    CONSTITUTIONAL: +weakness, dizziness, weight loss (unable to quantify), No fevers or chills  EYES/ENT: + visual changes in the L eye;  No vertigo or throat pain   NECK: No pain or stiffness  RESPIRATORY: + dry cough, shortness of breath,  No wheezing, hemoptysis  CARDIOVASCULAR: No chest pain or palpitations  GASTROINTESTINAL: +nausea, vomiting,  No abdominal or epigastric pain. No hematemesis; No diarrhea or constipation. No melena or hematochezia.  GENITOURINARY: No dysuria, frequency or hematuria  NEUROLOGICAL: No numbness   SKIN: No itching, rashes    In the ED VS Tmax 98.2 HR 80-84, -148/66-81, RR 18, SpO2 96% RA  In the ED Labs significant for Na 126, Cl 86, RVP positive for parainfluenza.   In the ED patient received Klor 40mEq and normal saline bolus x1L (31 Aug 2018 21:26)                            12.2   5.7   )-----------( 278      ( 06 Sep 2018 06:55 )             36.1       09-06    134<L>  |  98  |  11  ----------------------------<  78  4.4   |  23  |  0.81    Ca    9.7      06 Sep 2018 06:55  Mg     1.7     09-06      Vital Signs Last 24 Hrs  T(C): 36.6 (06 Sep 2018 15:40), Max: 37 (05 Sep 2018 20:37)  T(F): 97.9 (06 Sep 2018 15:40), Max: 98.6 (05 Sep 2018 20:37)  HR: 84 (06 Sep 2018 15:40) (81 - 91)  BP: 119/72 (06 Sep 2018 15:40) (107/64 - 140/76)  BP(mean): --  RR: 18 (06 Sep 2018 15:40) (18 - 20)  SpO2: 99% (06 Sep 2018 15:40) (97% - 99%)    MEDICATIONS  (STANDING):  ALBUTerol/ipratropium for Nebulization 3 milliLiter(s) Nebulizer every 6 hours  heparin  Injectable 5000 Unit(s) SubCutaneous every 12 hours  losartan 50 milliGRAM(s) Oral daily  NIFEdipine XL 30 milliGRAM(s) Oral daily    MEDICATIONS  (PRN):  guaiFENesin    Syrup 100 milliGRAM(s) Oral every 6 hours PRN Cough    Currently Undergoing Physical Therapy at bedside.    Functional Status Assessment:    Previous Level of Function:     · Ambulation Skills	independent; needs device; cane	  · Transfer Skills	independent; needs device	  · ADL Skills	independent	  · Additional Comments	As per chart, at last admission was living with daughter, but was demanding to move back to her own home, so has been living at home alone now with family checking in on her near daily. As per patient she lives in walk up building and her daughter will be staying with her (?). Patient uses a cane and states she has RW.	  		    Cognitive Status Examination:   · Orientation	oriented to person, place, time and situation; Hard of hearing	  · Level of Consciousness	alert	  · Follows Commands and Answers Questions	100% of the time; able to follow single-step instructions	    Range of Motion Exam:   · Range of Motion Examination	no ROM deficits were identified	    Manual Muscle Testing:   · Manual Muscle Testing Results	grossly assessed due to  BUE >3/5 & BLE 3/5 based on functional assessment	    Bed Mobility: Rolling/Turning:     · Level of [place]	contact guard; minimum assist (75% patients effort)	  · Physical Assist/Nonphysical Assist	set-up required; supervision; verbal cues; 1 person assist	  · Assistive Device	bed rails	    Bed Mobility: Scooting/Bridging:     · Level of [place]	minimum assist (75% patients effort)	  · Physical Assist/Nonphysical Assist	supervision; verbal cues; 1 person assist	    Bed Mobility: Sit to Supine:     · Level of [place]	contact guard; minimum assist (75% patients effort)	  · Physical Assist/Nonphysical Assist	supervision; verbal cues; 1 person assist	  · Assistive Device	bed rails	    Bed Mobility: Supine to Sit:     · Level of [place]	minimum assist (75% patients effort)	  · Physical Assist/Nonphysical Assist	set-up required; supervision; verbal cues; 1 person assist	  · Assistive Device	bed rails	    Bed Mobility Analysis:     · Bed Mobility Limitations	decreased ability to use arms for pushing/pulling; decreased ability to use legs for bridging/pushing	  · Impairments Contributing to Impaired Bed Mobility	impaired balance; decreased strength	    Transfer: Sit to Stand:     · Level of [place]	contact guard; minimum assist (75% patients effort)	  · Physical Assist/Nonphysical Assist	supervision; verbal cues; 1 person assist	  · Weight-Bearing Restrictions	weight-bearing as tolerated	  · Assistive Device	rolling walker	    Transfer: Stand to Sit:     · Level of [place]	contact guard	  · Physical Assist/Nonphysical Assist	supervision; verbal cues; 1 person assist	  · Weight-Bearing Restrictions	weight-bearing as tolerated	  · Assistive Device	rolling walker	    Sit/Stand Transfer Safety Analysis:     · Transfer Safety Concerns Noted	decreased weight-shifting ability	  · Impairments Contributing to Impaired Transfers	impaired balance; decreased strength	    Gait Skills:     · Level of [place]	contact guard; minimum assist (75% patients effort)	  · Physical Assist/Nonphysical Assist	supervision; verbal cues; 1 person assist	  · Weight-Bearing Restrictions	weight-bearing as tolerated	  · Assistive Device	rolling walker	  · Gait Distance	10 steps	    Gait Analysis:     · Gait Pattern Used	swing-to gait 	  · Gait Deviations Noted	decreased luci; increased time in double stance; decreased step length; decreased stride length	  · Impairments Contributing to Gait Deviations	impaired balance; decreased strength; posterior leaning against bed	    Balance Skills Assessment:     · Sitting Balance: Static	good balance 	  · Sitting Balance: Dynamic	good balance 	  · Sit-to-Stand Balance	fair balance 	  · Standing Balance: Static	fair balance 	  · Standing Balance: Dynamic	fair minus 	  · Systems Impairment Contributing to Balance Disturbance	musculoskeletal	  · Identified Impairments Contributing to Balance Disturbance	decreased strength	    Sensory Examination:   Sensory Examination:    Grossly Intact:   · Gross Sensory Examination	Grossly Intact; Left UE; Right UE; Left LE; Right LE	      Clinical Impressions:   · Criteria for Skilled Therapeutic Interventions	impairments found; functional limitations in following categories; rehab potential; therapy frequency; anticipated discharge recommendation	  · Impairments Found (describe specific impairments)	aerobic capacity/endurance; gait, locomotion, and balance; gross motor	  · Functional Limitations in Following Categories (describe specific limitations)	self-care; home management	  · Rehab Potential	good, to achieve stated therapy goals	  · Therapy Frequency	2-3x/week	    Reassessment on 9/6/2018      Therapeutic Interventions      Bed Mobility  Bed Mobility Training Symptoms Noted During/After Treatment: none  Bed Mobility Training Scooting: supervsion;  supervision  Bed Mobility Training Sit-to-Supine: 1 person assist;  verbal cues;  minimum assist (75% patient effort)  Bed Mobility Training Supine-to-Sit: contact guard;  1 person assist;  verbal cues;  set-up required  Bed Mobility Training Limitations: decreased ability to use legs for bridging/pushing;  decreased ability to use arms for pushing/pulling;  decreased strength    Sit-Stand Transfer Training  Sit-to-Stand Transfer Training Symptoms Noted During/After Treatment: dizziness  Transfer Training Sit-to-Stand Transfer: contact guard;  minimum assist (75% patient effort);  1 person assist;  verbal cues;  rolling walker  Transfer Training Stand-to-Sit Transfer: contact guard;  1 person assist;  verbal cues;  rolling walker  Sit-to-Stand Transfer Training Transfer Safety Analysis: decreased balance;  decreased strength;  impaired balance;  rolling walker    Gait Training  Gait Training Treatment not Performed: unable to perform due to severe dizziness      PM&R Impression: as above    Disposition Plan Recommendations: subacute rehab placement

## 2018-09-06 NOTE — PROGRESS NOTE ADULT - PROBLEM SELECTOR PLAN 1
Multiple falls over the past 3 days in setting of vomiting, decreased PO intake and diuretic use. Patient complains or dizziness and weakness. Found to be + parainfluenza virus, hyponatremic 126, hypo K 3.3 and hypoCl 86. Falls likely 2/2 weakness in the setting of infection, poor PO intake and vomiting. Patient denies CP or palpitation prior to fall making cardio etiology less likely, negative troponin on admission. Denies LOC. CT Head with no evidence of acute intracranial hemorrhage.  -continue to monitor for signs of ongoing dizziness  - Hold hydrochlorothiazide in the setting of dehydration

## 2018-09-06 NOTE — PROGRESS NOTE ADULT - PROBLEM SELECTOR PLAN 5
Patient with a hx of HTN on 3 medication at home. Losartan 50, HCTZ 12.5 and nifedipine 30XL. Found to be HTN in the ED.   - Restarted losartan 50 and nifedipine 30  -continue to monitor BP and orthostatics  - hold diuretic in the setting of dehydration

## 2018-09-07 VITALS
RESPIRATION RATE: 18 BRPM | SYSTOLIC BLOOD PRESSURE: 129 MMHG | TEMPERATURE: 99 F | DIASTOLIC BLOOD PRESSURE: 79 MMHG | OXYGEN SATURATION: 99 % | HEART RATE: 90 BPM

## 2018-09-07 PROCEDURE — 97530 THERAPEUTIC ACTIVITIES: CPT

## 2018-09-07 PROCEDURE — 83735 ASSAY OF MAGNESIUM: CPT

## 2018-09-07 PROCEDURE — 81003 URINALYSIS AUTO W/O SCOPE: CPT

## 2018-09-07 PROCEDURE — 83605 ASSAY OF LACTIC ACID: CPT

## 2018-09-07 PROCEDURE — 84443 ASSAY THYROID STIM HORMONE: CPT

## 2018-09-07 PROCEDURE — 84300 ASSAY OF URINE SODIUM: CPT

## 2018-09-07 PROCEDURE — 87581 M.PNEUMON DNA AMP PROBE: CPT

## 2018-09-07 PROCEDURE — 70450 CT HEAD/BRAIN W/O DYE: CPT

## 2018-09-07 PROCEDURE — 87486 CHLMYD PNEUM DNA AMP PROBE: CPT

## 2018-09-07 PROCEDURE — 71046 X-RAY EXAM CHEST 2 VIEWS: CPT

## 2018-09-07 PROCEDURE — 82607 VITAMIN B-12: CPT

## 2018-09-07 PROCEDURE — 87798 DETECT AGENT NOS DNA AMP: CPT

## 2018-09-07 PROCEDURE — 93005 ELECTROCARDIOGRAM TRACING: CPT

## 2018-09-07 PROCEDURE — 80048 BASIC METABOLIC PNL TOTAL CA: CPT

## 2018-09-07 PROCEDURE — 83935 ASSAY OF URINE OSMOLALITY: CPT

## 2018-09-07 PROCEDURE — 85025 COMPLETE CBC W/AUTO DIFF WBC: CPT

## 2018-09-07 PROCEDURE — 87633 RESP VIRUS 12-25 TARGETS: CPT

## 2018-09-07 PROCEDURE — 84100 ASSAY OF PHOSPHORUS: CPT

## 2018-09-07 PROCEDURE — 94640 AIRWAY INHALATION TREATMENT: CPT

## 2018-09-07 PROCEDURE — 85027 COMPLETE CBC AUTOMATED: CPT

## 2018-09-07 PROCEDURE — 82746 ASSAY OF FOLIC ACID SERUM: CPT

## 2018-09-07 PROCEDURE — 80053 COMPREHEN METABOLIC PANEL: CPT

## 2018-09-07 PROCEDURE — 99285 EMERGENCY DEPT VISIT HI MDM: CPT | Mod: 25

## 2018-09-07 PROCEDURE — 87184 SC STD DISK METHOD PER PLATE: CPT

## 2018-09-07 PROCEDURE — 97161 PT EVAL LOW COMPLEX 20 MIN: CPT

## 2018-09-07 PROCEDURE — 99239 HOSP IP/OBS DSCHRG MGMT >30: CPT

## 2018-09-07 PROCEDURE — 97116 GAIT TRAINING THERAPY: CPT

## 2018-09-07 PROCEDURE — 83930 ASSAY OF BLOOD OSMOLALITY: CPT

## 2018-09-07 PROCEDURE — 87086 URINE CULTURE/COLONY COUNT: CPT

## 2018-09-07 PROCEDURE — 36415 COLL VENOUS BLD VENIPUNCTURE: CPT

## 2018-09-07 PROCEDURE — 84484 ASSAY OF TROPONIN QUANT: CPT

## 2018-09-07 RX ORDER — ACETAMINOPHEN 500 MG
650 TABLET ORAL ONCE
Qty: 0 | Refills: 0 | Status: COMPLETED | OUTPATIENT
Start: 2018-09-07 | End: 2018-09-07

## 2018-09-07 RX ADMIN — Medication 650 MILLIGRAM(S): at 02:20

## 2018-09-07 RX ADMIN — Medication 100 MILLIGRAM(S): at 14:49

## 2018-09-07 RX ADMIN — Medication 3 MILLILITER(S): at 11:14

## 2018-09-07 RX ADMIN — HEPARIN SODIUM 5000 UNIT(S): 5000 INJECTION INTRAVENOUS; SUBCUTANEOUS at 11:14

## 2018-09-07 RX ADMIN — Medication 30 MILLIGRAM(S): at 06:53

## 2018-09-07 RX ADMIN — LOSARTAN POTASSIUM 50 MILLIGRAM(S): 100 TABLET, FILM COATED ORAL at 06:53

## 2018-09-07 RX ADMIN — Medication 650 MILLIGRAM(S): at 01:44

## 2018-09-07 RX ADMIN — Medication 3 MILLILITER(S): at 06:53

## 2018-09-07 NOTE — PROGRESS NOTE ADULT - PROBLEM SELECTOR PLAN 1
Multiple falls over the past 3 days in setting of vomiting, decreased PO intake and diuretic use. Found to be + parainfluenza virus, now resolved.  Falls likely 2/2 weakness in the setting of infection, poor PO intake and vomiting.  CT Head with no evidence of acute intracranial hemorrhage.  -continue to monitor for signs of ongoing dizziness  - Hold hydrochlorothiazide in the setting of dehydration

## 2018-09-07 NOTE — PROGRESS NOTE ADULT - PROBLEM SELECTOR PLAN 3
On admission found to be hypoNa 126, likely hypovolemic hyponatremia. Resolving with fluids.  -continue to monitor BMP

## 2018-09-07 NOTE — PROGRESS NOTE ADULT - PROBLEM SELECTOR PLAN 4
Hx of asthma on albuterol inhaler PRN.   - check peak flow   - c/w Duoneb q6

## 2018-09-07 NOTE — PROGRESS NOTE ADULT - ASSESSMENT
89yo F Jehovah witnesswith PMH HTN, Asthma, CVA (>10 years ago, no residual deficits), palpitations presenting from home with falls and weakness. Admitted to the regional medical floors for further management, found to have parainfluenza and hyponatremia.
89yo F Jehovah witnesswith PMH HTN, Asthma, CVA (>10 years ago, no residual deficits), palpitations presenting from home with falls and weakness. Admitted to the regional medical floors for further management, found to have parainfluenza and hyponatremia.
1) Dehydration  resolved.  DC IVF    2) Hyponatremia  - resolved    3) URI:  nebs, O2, cough tx    4)  HTN:  resume home BP meds as tolerated      5) Frequent falls  - PT when clinically amenable    Pt's family wwill have a conversation to clarify code status.  As of now Full code, since unclear on agreement among family members, but both daughters agree that they do not want extreme measures to prolong life.
89yo F Jehovah witnesswith PMH HTN, Asthma, CVA (>10 years ago, no residual deficits), palpitations presenting from home with falls and weakness. Admitted to the regional medical floors for further management, found to have parainfluenza and hyponatremia.
89yo F Jehovah witnesswith PMH HTN, Asthma, CVA (>10 years ago, no residual deficits), palpitations presenting from home with falls and weakness. Admitted to the regional medical floors for further management, found to have parainfluenza and hyponatremia.    Problem/Plan - 1:  ·  Problem: Frequent falls.  Plan: Multiple falls over the past 3 days in setting of vomiting, decreased PO intake and diuretic use. Patient complains or dizziness and weakness. Found to be + parainfluenza virus, hyponatremic 126, hypo K 3.3 and hypoCl 86. Falls likely 2/2 weakness in the setting of infection, poor PO intake and vomiting. Patient denies CP or palpitation prior to fall making cardio etiology less likely, negative troponin on admission. Denies LOC. CT Head with no evidence of acute intracranial hemorrhage.  -continue to monitor for signs of ongoing dizziness  - Hold hydrochlorothiazide in the setting of dehydration.     Problem/Plan - 2:  ·  Problem: Parainfluenza infection.  Plan: Patient presenting with dry cough for 3 days, found to be + parainfluenza virus. Scattered rhonchi on lung exam, no active wheezing. Improved today.   - symptomatic management with cough suppressant and Duoneb.     Problem/Plan - 3:  ·  Problem: Hyponatremia.  Plan: On admission found to be hypoNa 126, likely hypovolemic hyponatremia. Resolving with fluids, 136 today.  -continue to monitor BMP.
89yo F Jehovah witnesswith PMH HTN, Asthma, CVA (>10 years ago, no residual deficits), palpitations presenting from home with falls and weakness. Admitted to the regional medical floors for further management, found to have parainfluenza and hyponatremia.
89yo F Jehovah witnesswith PMH HTN, Asthma, CVA (>10 years ago, no residual deficits), palpitations presenting from home with falls and weakness. Admitted to the regional medical floors for further management, found to have parainfluenza and hyponatremia.

## 2018-09-07 NOTE — PROGRESS NOTE ADULT - PROVIDER SPECIALTY LIST ADULT
Hospitalist
Internal Medicine
Rehab Medicine
Internal Medicine
Internal Medicine

## 2018-09-07 NOTE — PROGRESS NOTE ADULT - PROBLEM SELECTOR PLAN 5
Patient with a hx of HTN on 3 medication at home. Losartan 50, HCTZ 12.5 and nifedipine 30XL. Found to be HTN in the ED.   - Restarted losartan 50 and nifedipine 30  -continue to monitor BP and orthostatics  - hold diuretic in the setting of dehydration Patient with a hx of HTN on 3 medication at home. Losartan 50, HCTZ 12.5 and nifedipine 30XL. Found to be HTN in the ED. SBP increasing 156 this AM, will continue to monitor.   -will increase cozaar to 100mg daily (from cozaar 50mg daily) if SBP still increased  - Restarted losartan 50 and nifedipine 30  -continue to monitor BP and orthostatics  - hold diuretic in the setting of dehydration

## 2018-09-07 NOTE — PROGRESS NOTE ADULT - PROBLEM SELECTOR PROBLEM 2
Parainfluenza infection

## 2018-09-07 NOTE — PROGRESS NOTE ADULT - SUBJECTIVE AND OBJECTIVE BOX
O/N Events: SBP 170s with back pain. Given Tylenol with resolution of sx.   Subjective/ROS: Denies HA, CP, SOB, n/v, changes in bowel/urinary habits.  12pt ROS otherwise negative.    VITALS  Vital Signs Last 24 Hrs  T(C): 36.5 (07 Sep 2018 05:24), Max: 36.8 (06 Sep 2018 22:45)  T(F): 97.7 (07 Sep 2018 05:24), Max: 98.3 (06 Sep 2018 22:45)  HR: 86 (07 Sep 2018 05:24) (82 - 88)  BP: 156/71 (07 Sep 2018 05:24) (107/64 - 170/91)  BP(mean): --  RR: 19 (07 Sep 2018 05:24) (18 - 20)  SpO2: 100% (07 Sep 2018 05:24) (97% - 100%)    CAPILLARY BLOOD GLUCOSE          PHYSICAL EXAM  General: NAD  Head: NC/AT; MMM; PERRL; EOMI;  Neck: Supple; no JVD  Respiratory: CTA B/L; no wheezes/crackles   Cardiovascular: Regular rhythm/rate; S1/S2   Gastrointestinal: Soft; NTND; normoactive BS  Extremities: WWP; no edema/cyanosis  Neurological:  CNII-XII grossly intact;     MEDICATIONS  (STANDING):  ALBUTerol/ipratropium for Nebulization 3 milliLiter(s) Nebulizer every 6 hours  heparin  Injectable 5000 Unit(s) SubCutaneous every 12 hours  losartan 50 milliGRAM(s) Oral daily  NIFEdipine XL 30 milliGRAM(s) Oral daily    MEDICATIONS  (PRN):  guaiFENesin    Syrup 100 milliGRAM(s) Oral every 6 hours PRN Cough      No Known Allergies      LABS                        12.2   5.7   )-----------( 278      ( 06 Sep 2018 06:55 )             36.1     09-06    134<L>  |  98  |  11  ----------------------------<  78  4.4   |  23  |  0.81    Ca    9.7      06 Sep 2018 06:55  Mg     1.7     09-06                IMAGING/EKG/ETC  Reviewed

## 2018-09-07 NOTE — PROGRESS NOTE ADULT - PROBLEM SELECTOR PLAN 6
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

## 2018-09-07 NOTE — PROGRESS NOTE ADULT - PROBLEM SELECTOR PLAN 2
Patient presenting with dry cough for 3 days, found to be + parainfluenza virus. Scattered rhonchi on lung exam, no active wheezing. Improved today.   - symptomatic management with cough suppressant and Duoneb
Patient presenting with dry cough for 3 days, found to be + parainfluenza virus. Scattered rhonchi on lung exam, no active wheezing. Improved today.   - symptomatic management with cough suppressant and Duoneb
Patient presenting with dry cough for 3 days, found to be + parainfluenza virus. No longer on contact precautions. Lungs clear, no wheezing. Occasional dry cough.   - symptomatic management with cough suppressant and Duoneb
Patient presenting with dry cough for 3 days, found to be + parainfluenza virus. Scattered rhonchi on lung exam, no active wheezing. Improved today.   - symptomatic management with cough suppressant and Duoneb
Patient presenting with dry cough for 3 days, found to be + parainfluenza virus. Scattered rhonchi on lung exam, no active wheezing. Improved today.   - symptomatic management with cough suppressant and Duoneb

## 2018-09-12 DIAGNOSIS — I10 ESSENTIAL (PRIMARY) HYPERTENSION: ICD-10-CM

## 2018-09-12 DIAGNOSIS — B34.8 OTHER VIRAL INFECTIONS OF UNSPECIFIED SITE: ICD-10-CM

## 2018-09-12 DIAGNOSIS — Z80.42 FAMILY HISTORY OF MALIGNANT NEOPLASM OF PROSTATE: ICD-10-CM

## 2018-09-12 DIAGNOSIS — E87.6 HYPOKALEMIA: ICD-10-CM

## 2018-09-12 DIAGNOSIS — Z51.5 ENCOUNTER FOR PALLIATIVE CARE: ICD-10-CM

## 2018-09-12 DIAGNOSIS — Z91.81 HISTORY OF FALLING: ICD-10-CM

## 2018-09-12 DIAGNOSIS — Z86.73 PERSONAL HISTORY OF TRANSIENT ISCHEMIC ATTACK (TIA), AND CEREBRAL INFARCTION WITHOUT RESIDUAL DEFICITS: ICD-10-CM

## 2018-09-12 DIAGNOSIS — R63.8 OTHER SYMPTOMS AND SIGNS CONCERNING FOOD AND FLUID INTAKE: ICD-10-CM

## 2018-09-12 DIAGNOSIS — R53.1 WEAKNESS: ICD-10-CM

## 2018-09-12 DIAGNOSIS — Z98.49 CATARACT EXTRACTION STATUS, UNSPECIFIED EYE: ICD-10-CM

## 2018-09-12 DIAGNOSIS — E86.0 DEHYDRATION: ICD-10-CM

## 2018-09-12 DIAGNOSIS — H54.7 UNSPECIFIED VISUAL LOSS: ICD-10-CM

## 2018-09-12 DIAGNOSIS — I95.1 ORTHOSTATIC HYPOTENSION: ICD-10-CM

## 2018-09-12 DIAGNOSIS — R00.2 PALPITATIONS: ICD-10-CM

## 2018-09-12 DIAGNOSIS — E44.0 MODERATE PROTEIN-CALORIE MALNUTRITION: ICD-10-CM
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
## 2018-09-12 DIAGNOSIS — E87.1 HYPO-OSMOLALITY AND HYPONATREMIA: ICD-10-CM
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
## 2018-09-12 DIAGNOSIS — J45.909 UNSPECIFIED ASTHMA, UNCOMPLICATED: ICD-10-CM

## 2019-07-03 NOTE — PATIENT PROFILE ADULT. - BRADEN SCORE (IF 18 OR LESS ACTIVATE SKIN INJURY RISK INCREASED GUIDELINE), MLM
Follow Up:      Inverval History/ROS:Patient is a 48y old  Male who presents with a chief complaint of Bleeding from tracheal stoma (03 Jul 2019 06:38)    + fever  On pressors    Awake, responsive  Allergies    No Known Allergies    Intolerances        ANTIMICROBIALS:  meropenem  IVPB 1000 every 8 hours  vancomycin  IVPB 1000 every 12 hours      OTHER MEDS:  bisacodyl Suppository 10 milliGRAM(s) Rectal daily PRN  chlorhexidine 4% Liquid 1 Application(s) Topical <User Schedule>  dexmedetomidine Infusion 0.7 MICROgram(s)/kG/Hr IV Continuous <Continuous>  heparin  Injectable 5000 Unit(s) SubCutaneous every 12 hours  lactated ringers. 1000 milliLiter(s) IV Continuous <Continuous>  methylPREDNISolone sodium succinate Injectable 40 milliGRAM(s) IV Push every 8 hours  pantoprazole  Injectable 40 milliGRAM(s) IV Push daily  phenylephrine    Infusion 0.5 MICROgram(s)/kG/Min IV Continuous <Continuous>  propofol Infusion 40 MICROgram(s)/kG/Min IV Continuous <Continuous>      Vital Signs Last 24 Hrs  T(C): 36.8 (03 Jul 2019 08:00), Max: 38.6 (02 Jul 2019 20:00)  T(F): 98.2 (03 Jul 2019 08:00), Max: 101.4 (02 Jul 2019 20:00)  HR: 95 (03 Jul 2019 10:00) (59 - 100)  BP: 96/80 (03 Jul 2019 09:00) (83/60 - 135/106)  BP(mean): 84 (03 Jul 2019 09:00) (66 - 113)  RR: 11 (03 Jul 2019 10:00) (11 - 18)  SpO2: 100% (03 Jul 2019 10:00) (98% - 100%)    PHYSICAL EXAM:  General: [x ] Intubated  HEAD/EYES: [ ] PERRL [x ] white sclera [ ] icterus  ENT:  [ ] normal [x ] supple [ ] thrush [ ] pharyngeal exudate  Cardiovascular:   [ ] murmur [x ] normal [ ] PPM/AICD  Respiratory:  [ ] clourse BS  GI:  [x ] soft, non-tender, normal bowel sounds  :  [ ] mock [ x] no CVA tenderness   Musculoskeletal:  [ ] no synovitis  Neurologic:  [ ] non-focal exam   Skin:  [x ] no rash  Lymph: [x ] no lymphadenopathy  Psychiatric:  [ ] appropriate affect [ ] alert & oriented  Lines:  [ x] no phlebitis [ ] central line                                10.1   19.29 )-----------( 159      ( 03 Jul 2019 03:10 )             33.8       07-03    140  |  91<L>  |  27<H>  ----------------------------<  140<H>  4.0   |  42<H>  |  0.80    Ca    9.7      03 Jul 2019 03:10  Phos  3.0     07-02  Mg     2.2     07-02    TPro  6.4  /  Alb  3.5  /  TBili  0.5  /  DBili  x   /  AST  13  /  ALT  20  /  AlkPhos  54  07-03          MICROBIOLOGY:  Culture - Respiratory with Gram Stain (07.02.19 @ 12:24)    Gram Stain Sputum:   GPCPR^Gram Pos Cocci in Pairs  QUANTITY OF BACTERIA SEEN: RARE (1+)  WBC^White Blood Cells  QNTY CELLS IN GRAM STAIN: FEW (2+)    Specimen Source: BRONCHIAL LAVAGE    Culture - Blood (06.30.19 @ 14:56)    Culture - Blood:   NO ORGANISMS ISOLATED  NO ORGANISMS ISOLATED AT 48 HRS.    Specimen Source: BLOOD    Repeat blood cx testing    RADIOLOGY: 18

## 2019-07-25 NOTE — ED PROVIDER NOTE - GASTROINTESTINAL, MLM
Pt reports 50% improvement post inj   Pt currently has no pain   She said when she does have pain it can go up to 6/10 Abdomen soft, non-tender, no guarding.

## 2021-11-09 NOTE — ED PROVIDER NOTE - TEMPLATE, MLM
[FreeTextEntry1] : Patient is a 67 yo F who presents for microhematuria.\par She reports she had microhematuria for years with prior PCP as far back as 2016.  She reportedly had negative cytology.\par No gross hematuria, dysuria, difficulty voiding.  MInimal TRENT.\par Most recent UA with PCP showed 6 rbcs, but also epi cells noted.\par \par She has no h/o  malignancy, h/o breast ca. Cardiac

## 2021-12-21 PROBLEM — R00.2 PALPITATIONS: Chronic | Status: ACTIVE | Noted: 2018-08-31

## 2021-12-28 ENCOUNTER — APPOINTMENT (OUTPATIENT)
Dept: OTOLARYNGOLOGY | Facility: CLINIC | Age: 86
End: 2021-12-28
Payer: MEDICARE

## 2021-12-28 DIAGNOSIS — H61.23 IMPACTED CERUMEN, BILATERAL: ICD-10-CM

## 2021-12-28 DIAGNOSIS — H90.3 SENSORINEURAL HEARING LOSS, BILATERAL: ICD-10-CM

## 2021-12-28 PROBLEM — Z00.00 ENCOUNTER FOR PREVENTIVE HEALTH EXAMINATION: Status: ACTIVE | Noted: 2021-12-28

## 2021-12-28 PROCEDURE — 99203 OFFICE O/P NEW LOW 30 MIN: CPT | Mod: 25

## 2021-12-28 PROCEDURE — 69210 REMOVE IMPACTED EAR WAX UNI: CPT

## 2021-12-28 NOTE — REVIEW OF SYSTEMS
[Patient Intake Form Reviewed] : Patient intake form was reviewed [As Noted in HPI] : as noted in HPI [Hearing Loss] : hearing loss [Ear Noises] : ear noises [Negative] : Heme/Lymph

## 2022-01-11 NOTE — PHYSICAL EXAM
[Normal] : mucosa is normal [Midline] : trachea located in midline position [de-identified] : bilateral cerumen impaction.  Bilateral Sensorineural Hearing Loss

## 2022-01-11 NOTE — HISTORY OF PRESENT ILLNESS
[de-identified] : 89 years old female patient with history of ENT Evaluation for decreased hearing for the past couple months.   Patient is present today in the office with bilateral cerumen impaction.  Bilateral Sensorineural Hearing Loss

## 2022-01-11 NOTE — CONSULT LETTER
[Dear  ___] : Dear  [unfilled], [Consult Letter:] : I had the pleasure of evaluating your patient, [unfilled]. [Please see my note below.] : Please see my note below. [Consult Closing:] : Thank you very much for allowing me to participate in the care of this patient.  If you have any questions, please do not hesitate to contact me. [Sincerely,] : Sincerely, [FreeTextEntry3] : Finn Leiva MD, FACS\par Professor of Otolaryngology, [employer] at [facility]\par Director, [facility], Department of Otolaryngology, [facility]\par , Head & Neck Service Line, [facility]\par

## 2022-01-11 NOTE — PROCEDURE
[NHL] : [facility] [Cerumen Impaction] : Cerumen Impaction [] : Removal of Cerumen [FreeTextEntry5] : Mcmahan suction # 5, Ear Curette, Ear alligator

## 2022-01-11 NOTE — REASON FOR VISIT
[Initial Evaluation] : an initial evaluation for [Formal Caregiver] : formal caregiver [FreeTextEntry2] : ENT Evaluation for decreased hearing for the past couple months.  Patient states he level of severity is a level 10 out of 10  and it occurs constant.  Patient states nothing helps to improve or worsens her ENT Evaluation for decreased hearing for the past couple months.

## 2023-09-08 NOTE — CONSULT NOTE ADULT - CONSULT REASON
PM&R evaluation Cyclosporine Counseling:  I discussed with the patient the risks of cyclosporine including but not limited to hypertension, gingival hyperplasia,myelosuppression, immunosuppression, liver damage, kidney damage, neurotoxicity, lymphoma, and serious infections. The patient understands that monitoring is required including baseline blood pressure, CBC, CMP, lipid panel and uric acid, and then 1-2 times monthly CMP and blood pressure.

## 2023-10-17 NOTE — PROGRESS NOTE ADULT - PROBLEM SELECTOR PLAN 7
F: normal saline @ 100cc/hr dc'd as patient no longer appears dehydrated  E: replete PRN   N: DASH diet Scalpel Size: 15 blade

## 2023-11-14 NOTE — PATIENT PROFILE ADULT. - NS PRO AD NO ADVANCE DIRECTIVE
No Detail Level: Detailed Depth Of Biopsy: dermis Was A Bandage Applied: Yes Size Of Lesion In Cm: 0.6 X Size Of Lesion In Cm: 0 Biopsy Type: H and E Biopsy Method: 15 blade Anesthesia Type: 1% lidocaine with epinephrine Anesthesia Volume In Cc (Will Not Render If 0): 0.5 Hemostasis: Drysol Wound Care: Petrolatum Dressing: bandage Destruction After The Procedure: No Type Of Destruction Used: Curettage Curettage Text: The wound bed was treated with curettage after the biopsy was performed. Cryotherapy Text: The wound bed was treated with cryotherapy after the biopsy was performed. Electrodesiccation Text: The wound bed was treated with electrodesiccation after the biopsy was performed. Electrodesiccation And Curettage Text: The wound bed was treated with electrodesiccation and curettage after the biopsy was performed. Silver Nitrate Text: The wound bed was treated with silver nitrate after the biopsy was performed. Consent: Written consent was obtained and risks were reviewed including but not limited to scarring, infection, bleeding, scabbing, incomplete removal, nerve damage and allergy to anesthesia. Post-Care Instructions: I reviewed with the patient in detail post-care instructions. Patient is to keep the biopsy site dry overnight, and then apply bacitracin twice daily until healed. Patient may apply hydrogen peroxide soaks to remove any crusting. Notification Instructions: Patient will be notified of biopsy results. However, patient instructed to call the office if not contacted within 2 weeks. Billing Type: Third-Party Bill Information: Selecting Yes will display possible errors in your note based on the variables you have selected. This validation is only offered as a suggestion for you. PLEASE NOTE THAT THE VALIDATION TEXT WILL BE REMOVED WHEN YOU FINALIZE YOUR NOTE. IF YOU WANT TO FAX A PRELIMINARY NOTE YOU WILL NEED TO TOGGLE THIS TO 'NO' IF YOU DO NOT WANT IT IN YOUR FAXED NOTE. Billing Type: Third-Party Bill
